# Patient Record
Sex: MALE | Race: WHITE | NOT HISPANIC OR LATINO | Employment: OTHER | URBAN - METROPOLITAN AREA
[De-identification: names, ages, dates, MRNs, and addresses within clinical notes are randomized per-mention and may not be internally consistent; named-entity substitution may affect disease eponyms.]

---

## 2017-04-10 ENCOUNTER — GENERIC CONVERSION - ENCOUNTER (OUTPATIENT)
Dept: OTHER | Facility: OTHER | Age: 77
End: 2017-04-10

## 2017-08-07 ENCOUNTER — ALLSCRIPTS OFFICE VISIT (OUTPATIENT)
Dept: OTHER | Facility: OTHER | Age: 77
End: 2017-08-07

## 2017-08-17 ENCOUNTER — LAB CONVERSION - ENCOUNTER (OUTPATIENT)
Dept: OTHER | Facility: OTHER | Age: 77
End: 2017-08-17

## 2017-08-17 LAB
A/G RATIO (HISTORICAL): 1.4 (CALC) (ref 1–2.5)
ALBUMIN SERPL BCP-MCNC: 3.8 G/DL (ref 3.6–5.1)
ALP SERPL-CCNC: 66 U/L (ref 40–115)
ALT SERPL W P-5'-P-CCNC: 46 U/L (ref 9–46)
AST SERPL W P-5'-P-CCNC: 42 U/L (ref 10–35)
BASOPHILS # BLD AUTO: 0.5 %
BASOPHILS # BLD AUTO: 29 CELLS/UL (ref 0–200)
BILIRUB SERPL-MCNC: 0.6 MG/DL (ref 0.2–1.2)
BUN SERPL-MCNC: 18 MG/DL (ref 7–25)
BUN/CREA RATIO (HISTORICAL): ABNORMAL (CALC) (ref 6–22)
CALCIUM SERPL-MCNC: 9.8 MG/DL (ref 8.6–10.3)
CHLORIDE SERPL-SCNC: 105 MMOL/L (ref 98–110)
CHOLEST SERPL-MCNC: 180 MG/DL (ref 125–200)
CHOLEST/HDLC SERPL: 2.4 (CALC)
CO2 SERPL-SCNC: 30 MMOL/L (ref 20–31)
CREAT SERPL-MCNC: 0.98 MG/DL (ref 0.7–1.18)
DEPRECATED RDW RBC AUTO: 13.5 % (ref 11–15)
EGFR AFRICAN AMERICAN (HISTORICAL): 85 ML/MIN/1.73M2
EGFR-AMERICAN CALC (HISTORICAL): 74 ML/MIN/1.73M2
EOSINOPHIL # BLD AUTO: 194 CELLS/UL (ref 15–500)
EOSINOPHIL # BLD AUTO: 3.4 %
GAMMA GLOBULIN (HISTORICAL): 2.8 G/DL (CALC) (ref 1.9–3.7)
GLUCOSE (HISTORICAL): 93 MG/DL (ref 65–99)
HCT VFR BLD AUTO: 37.7 % (ref 38.5–50)
HDLC SERPL-MCNC: 74 MG/DL
HGB BLD-MCNC: 12.4 G/DL (ref 13.2–17.1)
LDL CHOLESTEROL (HISTORICAL): 94 MG/DL (CALC)
LYMPHOCYTES # BLD AUTO: 2366 CELLS/UL (ref 850–3900)
LYMPHOCYTES # BLD AUTO: 41.5 %
MCH RBC QN AUTO: 30.2 PG (ref 27–33)
MCHC RBC AUTO-ENTMCNC: 32.9 G/DL (ref 32–36)
MCV RBC AUTO: 92 FL (ref 80–100)
MONOCYTES # BLD AUTO: 553 CELLS/UL (ref 200–950)
MONOCYTES (HISTORICAL): 9.7 %
NEUTROPHILS # BLD AUTO: 2559 CELLS/UL (ref 1500–7800)
NEUTROPHILS # BLD AUTO: 44.9 %
NON-HDL-CHOL (CHOL-HDL) (HISTORICAL): 106 MG/DL (CALC)
PLATELET # BLD AUTO: 231 THOUSAND/UL (ref 140–400)
PMV BLD AUTO: 9.6 FL (ref 7.5–12.5)
POTASSIUM SERPL-SCNC: 4.2 MMOL/L (ref 3.5–5.3)
RBC # BLD AUTO: 4.1 MILLION/UL (ref 4.2–5.8)
RBC MORPHOLOGY (HISTORICAL): ABNORMAL
SODIUM SERPL-SCNC: 139 MMOL/L (ref 135–146)
TOTAL PROTEIN (HISTORICAL): 6.6 G/DL (ref 6.1–8.1)
TRIGL SERPL-MCNC: 62 MG/DL
TSH SERPL DL<=0.05 MIU/L-ACNC: 1.49 MIU/L (ref 0.4–4.5)
WBC # BLD AUTO: 5.7 THOUSAND/UL (ref 3.8–10.8)

## 2017-09-06 ENCOUNTER — GENERIC CONVERSION - ENCOUNTER (OUTPATIENT)
Dept: OTHER | Facility: OTHER | Age: 77
End: 2017-09-06

## 2018-01-10 NOTE — RESULT NOTES
Verified Results  (1) LIPID PANEL, FASTING [id number] 07:09AM Clotilde Medina     Test Name Result Flag Reference   CHOLESTEROL, TOTAL 167 mg/dL  125-200   HDL CHOLESTEROL 69 mg/dL  > OR = 40   TRIGLICERIDES 66 mg/dL  <844   LDL-CHOLESTEROL 85 mg/dL (calc)  <130   Desirable range <100 mg/dL for patients with CHD or  diabetes and <70 mg/dL for diabetic patients with  known heart disease  CHOL/HDLC RATIO 2 4 (calc)  < OR = 5 0   NON HDL CHOLESTEROL 98 mg/dL (calc)     Target for non-HDL cholesterol is 30 mg/dL higher than   LDL cholesterol target  (1) BASIC METABOLIC PROFILE [id number] 07:09AM Clotilde Medina   REPORT COMMENT:  FASTING:YES     Test Name Result Flag Reference   GLUCOSE 95 mg/dL  65-99   Fasting reference interval   UREA NITROGEN (BUN) 19 mg/dL  7-25   CREATININE 0 80 mg/dL  0 70-1 18   For patients >52years of age, the reference limit  for Creatinine is approximately 13% higher for people  identified as -American  eGFR NON-AFR  AMERICAN 86 mL/min/1 73m2  > OR = 60   eGFR AFRICAN AMERICAN 100 mL/min/1 73m2  > OR = 60   BUN/CREATININE RATIO   4-98   NOT APPLICABLE (calc)   SODIUM 140 mmol/L  135-146   POTASSIUM 4 2 mmol/L  3 5-5 3   CHLORIDE 105 mmol/L     CARBON DIOXIDE 28 mmol/L  19-30   CALCIUM 9 5 mg/dL  8 6-10 3   We received your handwritten test order for a chemistry  panel containing 8 to 13 analytes  We performed the AMA   defined Basic Metabolic Panel  If this is not what you   intended to order, please contact your local client   immediately so that we can  adjust our billing appropriately  You may also inquire  about alternative or additional testing

## 2018-01-12 NOTE — RESULT NOTES
Verified Results  (1) CBC/PLT/DIFF [id number] 08:29AM [facility]     Test Name Result Flag Reference   WHITE BLOOD CELL COUNT 5 7 Thousand/uL  3 8-10 8   RED BLOOD CELL COUNT 4 10 Million/uL L 4 20-5 80   HEMOGLOBIN 12 4 g/dL L 13 2-17 1   HEMATOCRIT 37 7 % L 38 5-50 0   MCV 92 0 fL  80 0-100 0   MCH 30 2 pg  27 0-33 0   MCHC 32 9 g/dL  32 0-36 0   RDW 13 5 %  11 0-15 0   PLATELET COUNT 818 Thousand/uL  140-400   ABSOLUTE NEUTROPHILS 2559 cells/uL  7084-3344   ABSOLUTE LYMPHOCYTES 2366 cells/uL  850-3900   ABSOLUTE MONOCYTES 553 cells/uL  200-950   ABSOLUTE EOSINOPHILS 194 cells/uL     ABSOLUTE BASOPHILS 29 cells/uL  0-200   NEUTROPHILS 44 9 %     LYMPHOCYTES 41 5 %     MONOCYTES 9 7 %     EOSINOPHILS 3 4 %     BASOPHILS 0 5 %     CBC MORPHOLOGY   NORMAL   Red cell morphology appears unremarkable   MPV 9 6 fL  7 5-12 5     (1) COMPREHENSIVE METABOLIC PANEL [id number] 74:92NF [facility]     Test Name Result Flag Reference   GLUCOSE 93 mg/dL  65-99   Fasting reference interval   UREA NITROGEN (BUN) 18 mg/dL  7-25   CREATININE 0 98 mg/dL  0 70-1 18   For patients >52years of age, the reference limit  for Creatinine is approximately 13% higher for people  identified as -American  eGFR NON-AFR   AMERICAN 74 mL/min/1 73m2  > OR = 60   eGFR AFRICAN AMERICAN 85 mL/min/1 73m2  > OR = 60   BUN/CREATININE RATIO   8-00   NOT APPLICABLE (calc)   SODIUM 139 mmol/L  135-146   POTASSIUM 4 2 mmol/L  3 5-5 3   CHLORIDE 105 mmol/L     CARBON DIOXIDE 30 mmol/L  20-31   CALCIUM 9 8 mg/dL  8 6-10 3   PROTEIN, TOTAL 6 6 g/dL  6 1-8 1   ALBUMIN 3 8 g/dL  3 6-5 1   GLOBULIN 2 8 g/dL (calc)  1 9-3 7   ALBUMIN/GLOBULIN RATIO 1 4 (calc)  1 0-2 5   BILIRUBIN, TOTAL 0 6 mg/dL  0 2-1 2   ALKALINE PHOSPHATASE 66 U/L     AST 42 U/L H 10-35   ALT 46 U/L  9-46     (1) LIPID PANEL, FASTING [id number] 08:29AM [facility]     Test Name Result Flag Reference   CHOLESTEROL, TOTAL 180 mg/dL  125-200   HDL CHOLESTEROL 74 mg/dL > OR = 40   TRIGLICERIDES 62 mg/dL  <418   LDL-CHOLESTEROL 94 mg/dL (calc)  <130   Desirable range <100 mg/dL for patients with CHD or  diabetes and <70 mg/dL for diabetic patients with  known heart disease  CHOL/HDLC RATIO 2 4 (calc)  < OR = 5 0    See Below     We received your handwritten test order and  performed the AMA defined lipid panel  If  this is not what you intended to order, please  contact your local   immediately so that we may adjust our billing  appropriately  You may also inquire about  alternative or additional testing  NON HDL CHOLESTEROL 106 mg/dL (calc)     Target for non-HDL cholesterol is 30 mg/dL higher than   LDL cholesterol target       (Q) TSH, 3RD GENERATION [id number] 08:29AM Kate Kaminski   REPORT COMMENT:  FASTING:YES     Test Name Result Flag Reference   TSH 1 49 mIU/L  0 40-4 50

## 2020-01-22 ENCOUNTER — APPOINTMENT (RX ONLY)
Dept: URBAN - METROPOLITAN AREA CLINIC 151 | Facility: CLINIC | Age: 80
Setting detail: DERMATOLOGY
End: 2020-01-22

## 2020-01-22 DIAGNOSIS — L57.0 ACTINIC KERATOSIS: ICD-10-CM

## 2020-01-22 DIAGNOSIS — Z71.89 OTHER SPECIFIED COUNSELING: ICD-10-CM

## 2020-01-22 DIAGNOSIS — Z85.828 PERSONAL HISTORY OF OTHER MALIGNANT NEOPLASM OF SKIN: ICD-10-CM

## 2020-01-22 DIAGNOSIS — L90.5 SCAR CONDITIONS AND FIBROSIS OF SKIN: ICD-10-CM

## 2020-01-22 DIAGNOSIS — L82.1 OTHER SEBORRHEIC KERATOSIS: ICD-10-CM

## 2020-01-22 DIAGNOSIS — L81.4 OTHER MELANIN HYPERPIGMENTATION: ICD-10-CM

## 2020-01-22 DIAGNOSIS — D18.0 HEMANGIOMA: ICD-10-CM

## 2020-01-22 PROBLEM — D18.01 HEMANGIOMA OF SKIN AND SUBCUTANEOUS TISSUE: Status: ACTIVE | Noted: 2020-01-22

## 2020-01-22 PROCEDURE — 17000 DESTRUCT PREMALG LESION: CPT

## 2020-01-22 PROCEDURE — ? LIQUID NITROGEN

## 2020-01-22 PROCEDURE — ? OTHER

## 2020-01-22 PROCEDURE — 17003 DESTRUCT PREMALG LES 2-14: CPT

## 2020-01-22 PROCEDURE — 99202 OFFICE O/P NEW SF 15 MIN: CPT | Mod: 25

## 2020-01-22 PROCEDURE — ? COUNSELING

## 2020-01-22 PROCEDURE — ? FULL BODY SKIN EXAM

## 2020-01-22 ASSESSMENT — LOCATION DETAILED DESCRIPTION DERM
LOCATION DETAILED: RIGHT NASAL ALA
LOCATION DETAILED: RIGHT RIB CAGE
LOCATION DETAILED: RIGHT SUPERIOR LATERAL NECK
LOCATION DETAILED: RIGHT MID-UPPER BACK
LOCATION DETAILED: LEFT SUPERIOR LATERAL BUCCAL CHEEK
LOCATION DETAILED: LEFT POSTERIOR SHOULDER
LOCATION DETAILED: LEFT MID-UPPER BACK
LOCATION DETAILED: NASAL SUPRATIP
LOCATION DETAILED: LEFT ANTERIOR DISTAL UPPER ARM
LOCATION DETAILED: LEFT RIB CAGE
LOCATION DETAILED: LEFT ANTERIOR SHOULDER
LOCATION DETAILED: RIGHT MEDIAL INFERIOR CHEST
LOCATION DETAILED: NASAL DORSUM
LOCATION DETAILED: RIGHT ANTERIOR DISTAL THIGH
LOCATION DETAILED: LEFT ANTERIOR DISTAL THIGH
LOCATION DETAILED: RIGHT VENTRAL DISTAL FOREARM
LOCATION DETAILED: PERIUMBILICAL SKIN
LOCATION DETAILED: LEFT PROXIMAL CALF
LOCATION DETAILED: RIGHT ANTERIOR DISTAL UPPER ARM
LOCATION DETAILED: RIGHT POSTERIOR SHOULDER
LOCATION DETAILED: LEFT PROXIMAL PRETIBIAL REGION
LOCATION DETAILED: RIGHT PROXIMAL PRETIBIAL REGION
LOCATION DETAILED: RIGHT DISTAL DORSAL FOREARM
LOCATION DETAILED: LEFT DISTAL POSTERIOR THIGH
LOCATION DETAILED: RIGHT DISTAL POSTERIOR THIGH
LOCATION DETAILED: SUPERIOR LUMBAR SPINE
LOCATION DETAILED: LEFT FOREHEAD
LOCATION DETAILED: LEFT MEDIAL INFERIOR CHEST
LOCATION DETAILED: LEFT INFERIOR UPPER BACK

## 2020-01-22 ASSESSMENT — LOCATION ZONE DERM
LOCATION ZONE: FACE
LOCATION ZONE: LEG
LOCATION ZONE: TRUNK
LOCATION ZONE: NOSE
LOCATION ZONE: ARM
LOCATION ZONE: NECK

## 2020-01-22 ASSESSMENT — LOCATION SIMPLE DESCRIPTION DERM
LOCATION SIMPLE: LEFT CHEEK
LOCATION SIMPLE: RIGHT PRETIBIAL REGION
LOCATION SIMPLE: LEFT UPPER BACK
LOCATION SIMPLE: NOSE
LOCATION SIMPLE: RIGHT FOREARM
LOCATION SIMPLE: ABDOMEN
LOCATION SIMPLE: LOWER BACK
LOCATION SIMPLE: LEFT FOREHEAD
LOCATION SIMPLE: LEFT THIGH
LOCATION SIMPLE: LEFT SHOULDER
LOCATION SIMPLE: RIGHT UPPER BACK
LOCATION SIMPLE: CHEST
LOCATION SIMPLE: RIGHT POSTERIOR THIGH
LOCATION SIMPLE: RIGHT NOSE
LOCATION SIMPLE: RIGHT SHOULDER
LOCATION SIMPLE: LEFT CALF
LOCATION SIMPLE: NECK
LOCATION SIMPLE: LEFT PRETIBIAL REGION
LOCATION SIMPLE: RIGHT UPPER ARM
LOCATION SIMPLE: LEFT UPPER ARM
LOCATION SIMPLE: RIGHT THIGH
LOCATION SIMPLE: LEFT POSTERIOR THIGH

## 2020-01-22 NOTE — PROCEDURE: OTHER
Note Text (......Xxx Chief Complaint.): This diagnosis correlates with the
Detail Level: Zone
Other (Free Text): Per patient surgically removed 2006

## 2020-01-22 NOTE — HPI: NON-MELANOMA SKIN CANCER F/U (HISTORY OF NMSC)
How Many Skin Cancers Have You Had?: one
What Is The Reason For Today's Visit?: History of Non-Melanoma Skin Cancer
When Was Your Last Cancer Diagnosed?: 2006 per patient surgically removed

## 2020-09-14 ENCOUNTER — CONSULT (OUTPATIENT)
Dept: DERMATOLOGY | Facility: CLINIC | Age: 80
End: 2020-09-14
Payer: COMMERCIAL

## 2020-09-14 ENCOUNTER — TELEPHONE (OUTPATIENT)
Dept: DERMATOLOGY | Facility: CLINIC | Age: 80
End: 2020-09-14

## 2020-09-14 VITALS — WEIGHT: 145 LBS | TEMPERATURE: 97.3 F | HEIGHT: 65 IN | BODY MASS INDEX: 24.16 KG/M2

## 2020-09-14 DIAGNOSIS — D48.9 NEOPLASM OF UNCERTAIN BEHAVIOR: Primary | ICD-10-CM

## 2020-09-14 PROCEDURE — 11102 TANGNTL BX SKIN SINGLE LES: CPT | Performed by: DERMATOLOGY

## 2020-09-14 PROCEDURE — 88305 TISSUE EXAM BY PATHOLOGIST: CPT | Performed by: STUDENT IN AN ORGANIZED HEALTH CARE EDUCATION/TRAINING PROGRAM

## 2020-09-14 PROCEDURE — 99203 OFFICE O/P NEW LOW 30 MIN: CPT | Performed by: DERMATOLOGY

## 2020-09-14 PROCEDURE — 11103 TANGNTL BX SKIN EA SEP/ADDL: CPT | Performed by: DERMATOLOGY

## 2020-09-14 RX ORDER — MELATONIN
1000 DAILY
COMMUNITY

## 2020-09-14 RX ORDER — ASPIRIN 81 MG/1
81 TABLET ORAL DAILY
COMMUNITY

## 2020-09-14 RX ORDER — AMLODIPINE BESYLATE AND ATORVASTATIN CALCIUM 5; 10 MG/1; MG/1
1 TABLET, FILM COATED ORAL DAILY
COMMUNITY

## 2020-09-14 NOTE — PROGRESS NOTES
[facility] Note     Patient Name: Janet Contreras  Encounter Date: [id number]     Have you been cared for by a [facility] in the last 3 years and, if so, which one? No    · Have you traveled outside of the [address] [place] [address] in the past 3 months or outside of the [facility] area in the last 2 weeks? No     May we call your Preferred Phone number to discuss your specific medical information? Yes     May we leave a detailed message that includes your specific medical information? Yes      Today's Chief Concerns:   Concern #1:  Bump on nose   Concern #2:      Past Medical History:  Have you personally ever had or currently have any of the following? · Skin cancer (such as Melanoma, Basal Cell Carcinoma, Squamous Cell Carcinoma? (If Yes, please provide more detail)- YES, BCC in 2006  · Eczema: No  · Psoriasis: No  · HIV/AIDS: No  · Hepatitis B or C: No  · Tuberculosis: No  · Systemic Immunosuppression such as Diabetes, Biologic or Immunotherapy, Chemotherapy, Organ Transplantation, Bone Marrow Transplantation (If YES, please provide more detail): No  · Radiation Treatment (If YES, please provide more detail): No  · Any other major medical conditions/concerns? (If Yes, which types)- YES, Prostate cancer 9/2006 radiation treatment    Social History:     What is/was your primary occupation? retired     What are your hobbies/past-times? Home making    Family History:  Have any of your "first degree relatives" (parent, brother, sister, or child) had any of the following       · Skin cancer such as Melanoma or Merkel Cell Carcinoma or Pancreatic Cancer? No  · Eczema, Asthma, Hay Fever or Seasonal Allergies: No  · Psoriasis or Psoriatic Arthritis: No  · Do any other medical conditions seem to run in your family? If Yes, what condition and which relatives?   YES, Father prostate cancer    Current Medications:   (please update all dermatological medications before printing patient's AVS!)      Current Outpatient Medications:     amLODIPine-atorvastatin (CADUET) 5-10 MG per tablet, Take 1 tablet by mouth daily, Disp: , Rfl:     aspirin (ECOTRIN LOW STRENGTH) 81 mg EC tablet, Take 81 mg by mouth daily, Disp: , Rfl:     bimatoprost (LUMIGAN) 0 01 % ophthalmic drops, 1 drop daily at bedtime, Disp: , Rfl:     cholecalciferol (VITAMIN D3) 1,000 units tablet, Take 1,000 Units by mouth daily, Disp: , Rfl:     timolol (BETIMOL) 0 5 % ophthalmic solution, 1 drop 2 (two) times a day, Disp: , Rfl:       Review of Systems:  Have you recently had or currently have any of the following? If YES, what are you doing for the problem? · Fever, chills or unintended weight loss: No  · Sudden loss or change in your vision: No  · Nausea, vomiting or blood in your stool: No  · Painful or swollen joints: No  · Wheezing or cough: No  · Changing mole or non-healing wound: YES, mole on nose  · Nosebleeds: No  · Excessive sweating: No  · Easy or prolonged bleeding? YES, Aspirin 81 mg  · Over the last 2 weeks, how often have you been bothered by the following problems? · Taking little interest or pleasure in doing things: 1 - Not at All  · Feeling down, depressed, or hopeless: 1 - Not at All  · Rapid heartbeat with epinephrine:  No    · FEMALES ONLY:    · Are you pregnant or planning to become pregnant? N/A  · Are you currently or planning to be nursing or breast feeding? N/A    · Any known allergies? No Known Allergies      Physical Exam:     Was a chaperone (Derm Clinical Assistant) present throughout the entire Physical Exam? Yes     Did the Dermatology Team specifically  the patient on the importance of a Full Skin Exam to be sure that nothing is missed clinically?  Yes}  o Did the patient ultimately request or accept a Full Skin Exam?  Yes  o Did the patient specifically refuse to have the areas "under-the-bra" examined by the Dermatologist? Rosa Sifuentes  o Did the patient specifically refuse to have the areas "under-the-underwear" examined by the Dermatologist? YES    CONSTITUTIONAL:   Vitals:    20 0934   Temp: (!) 97 3 °F (36 3 °C)   TempSrc: Temporal   Weight: 65 8 kg (145 lb)   Height: 5' 5" (1 651 m)       PSYCH: Normal mood and affect  EYES: Normal conjunctiva  ENT: Normal lips and oral mucosa  CARDIOVASCULAR: No edema  RESPIRATORY: Normal respirations  HEME/LYMPH/IMMUNO:  No regional lymphadenopathy except as noted below in "ASSESSMENT AND PLAN BY DIAGNOSIS"    SKIN:  FULL ORGAN SYSTEM EXAM  DELETE AFTER COMPLETION  Hair, Scalp, Ears, Face Normal except as noted below in Assessment     Assessment and Plan by Diagnosis:    History of Present Condition:     Duration:  How long has this been an issue for you?    o  2 months   Location Affected:  Where on the body is this affecting you?    o  nose   Quality:  Is there any bleeding, pain, itch, burning/irritation, or redness associated with the skin lesion? o  denies   Severity:  Describe any bleeding, pain, itch, burning/irritation, or redness on a scale of 1 to 10 (with 10 being the worst)  o  0   Timing:  Does this condition seem to be there pretty constantly or do you notice it more at specific times throughout the day?     o  constatnt   Context:  Have you ever noticed that this condition seems to be associated with specific activities you do?    o  denies   Modifying Factors:    o Anything that seems to make the condition worse?    -  denies  o What have you tried to do to make the condition better?    -  alcohol,vinegar, over the counter antibiotic cream   Associated Signs and Symptoms:  Does this skin lesion seem to be associated with any of the followin  NEOPLASM OF UNCERTAIN BEHAVIOR OF SKIN - SPECIMEN A    Physical Exam:   (Anatomic Location); (Size and Morphological Description); (Differential Diagnosis):    Right Supra-nasal tip; round firm somewhat discrete papule with 0 6 cm; rule out BCC vs SCC  o    Pertinent Positives:   Pertinent Negatives: No regional lymphadenopathy        Assessment and Plan:   I have discussed with the patient that a sample of skin via a "skin biopsy would be potentially helpful to further make a specific diagnosis under the microscope   Based on a thorough discussion of this condition and the management approach to it (including a comprehensive discussion of the known risks, side effects and potential benefits of treatment), the patient (family) agrees to implement the following specific plan:    o Procedure:  Skin Biopsy  After a thorough discussion of treatment options and risk/benefits/alternatives (including but not limited to local pain, scarring, dyspigmentation, blistering, possible superinfection, and inability to confirm a diagnosis via histopathology), verbal and written consent were obtained and portion of the rash was biopsied for tissue sample  See below for consent that was obtained from patient and subsequent Procedure Note  PROCEDURE SHAVE BIOPSY NOTE:  SPECIMEN A     Performing Physician: Dorian Alarcon   Anatomic Location; Clinical Description with size (cm); Pre-Op Diagnosis:   o A:Right Supra-nasal tip; round firm somewhat discrete papule with 0 6 cm; rule out BCC vs SCC     Post-op diagnosis: Same      Local anesthesia: 1% xylocaine with epi       Topical anesthesia: None     Hemostasis: Aluminum chloride       After obtaining informed consent  at which time there was a discussion about the purpose of biopsy  and low risks of infection and bleeding  The area was prepped and draped in the usual fashion  Anesthesia was obtained with 1% lidocaine with epinephrine  A shave biopsy to an appropriate sampling depth was obtained with a sterile blade (such as a 15-blade or DermaBlade)  The resulting wound was covered with surgical ointment and bandaged appropriately       The patient tolerated the procedure well without complications and was without signs of functional compromise  Specimen has been sent for review by Dermatopathology  Standard post-procedure care has been explained and has been included in written form within the patient's copy of Informed Consent  INFORMED CONSENT DISCUSSION AND POST-OPERATIVE INSTRUCTIONS FOR PATIENT    I   RATIONALE FOR PROCEDURE  I understand that a skin biopsy allows the Dermatologist to test a lesion or rash under the microscope to obtain a diagnosis  It usually involves numbing the area with numbing medication and removing a small piece of skin; sometimes the area will be closed with sutures  In this specific procedure, sutures are not usually needed  If any sutures are placed, then they are usually need to be removed in 2 weeks or less  I understand that my Dermatologist recommends that a skin "shave" biopsy be performed today  A local anesthetic, similar to the kind that a dentist uses when filling a cavity, will be injected with a very small needle into the skin area to be sampled  The injected skin and tissue underneath "will go to sleep and become numb so no pain should be felt afterwards  An instrument shaped like a tiny "razor blade" (shave biopsy instrument) will be used to cut a small piece of tissue and skin from the area so that a sample of tissue can be taken and examined more closely under the microscope  A slight amount of bleeding will occur, but it will be stopped with direct pressure and a pressure bandage and any other appropriate methods  I understands that a scar will form where the wound was created  Surgical ointment will be applied to help protect the wound  Sutures are not usually needed      II   RISKS AND POTENTIAL COMPLICATIONS   I understand the risks and potential complications of a skin biopsy include but are not limited to the following:   Bleeding   Infection   Pain   Scar/keloid   Skin discoloration   Incomplete Removal   Recurrence   Nerve Damage/Numbness/Loss of Function   Allergic Reaction to Anesthesia   Biopsies are diagnostic procedures and based on findings additional treatment or evaluation may be required   Loss or destruction of specimen resulting in no additional findings    My Dermatologist has explained to me the nature of the condition, the nature of the procedure, and the benefits to be reasonably expected compared with alternative approaches  My Dermatologist has discussed the likelihood of major risks or complications of this procedure including the specific risks listed above, such as bleeding, infection, and scarring/keloid  I understand that a scar is expected after this procedure  I understand that my physician cannot predict if the scar will form a "keloid," which extends beyond the borders of the wound that is created  A keloid is a thick, painful, and bumpy scar  A keloid can be difficult to treat, as it does not always respond well to therapy, which includes injecting cortisone directly into the keloid every few weeks  While this usually reduces the pain and size of the scar, it does not eliminate it  I understand that photographs may be taken before and after the procedure  These will be maintained as part of the medical providers confidential records and may not be made available to me  I further authorize the medical provider to use the photographs for teaching purposes or to illustrate scientific papers, books, or lectures if in his/her judgment, medical research, education, or science may benefit from its use  I have had an opportunity to fully inquire about the risks and benefits of this procedure and its alternatives  I have been given ample time and opportunity to ask questions and to seek a second opinion if I wished to do so  I acknowledge that there have specifically been no guarantees as to the cosmetic results from the procedure    I am aware that with any procedure there is always the possibility of an unexpected complication  III  POST-PROCEDURAL CARE (WHAT YOU WILL NEED TO DO "AFTER THE BIOPSY" TO OPTIMIZE HEALING)     Keep the area clean and dry  Try NOT to remove the bandage or get it wet for the first 24 hours   Gently clean the area and apply surgical ointment (such as Vaseline petrolatum ointment, which is available "over the counter" and not a prescription) to the biopsy site for up to 2 weeks straight  This acts to protect the wound from the outside world   Sutures are not usually placed in this procedure  If any sutures were placed, return for suture removal as instructed (generally 1 week for the face, 2 weeks for the body)   Take Acetaminophen (Tylenol) for discomfort, if no contraindications  Ibuprofen or aspirin could make bleeding worse   Call our office immediately for signs of infection: fever, chills, increased redness, warmth, tenderness, discomfort/pain, or pus or foul smell coming from the wound  WHAT TO DO IF THERE IS ANY BLEEDING? If a small amount of bleeding is noticed, place a clean cloth over the area and apply firm pressure for ten minutes  Check the wound after 10 minutes of direct pressure  If bleeding persists, try one more time for an additional 10 minutes of direct pressure on the area  If the bleeding becomes heavier or does not stop after the second attempt, or if you have any other questions about this procedure, then please call your [facility] Dermatologist by calling [phone] (SKIN)  I hereby acknowledge that I have reviewed and verified the site with my Dermatologist and have requested and authorized my Dermatologist to proceed with the procedure        2  NEOPLASM OF UNCERTAIN BEHAVIOR OF SKIN:  SPECIMEN B    Physical Exam:   (Anatomic Location); (Size and Morphological Description); (Differential Diagnosis):     - B: Right Auricle; pink rectangular papule with 0 6 cm; rule out SCC   Pertinent Positives:   Pertinent Negatives: No regional lymphadenopathy    Additional History of Present Condition:  Denies pain with sleeping  No bleeding  Unsure how long this has been here  Assessment and Plan:   I have discussed with the patient that a sample of skin via a "skin biopsy would be potentially helpful to further make a specific diagnosis under the microscope   Based on a thorough discussion of this condition and the management approach to it (including a comprehensive discussion of the known risks, side effects and potential benefits of treatment), the patient (family) agrees to implement the following specific plan:    o Recommend yearly skin exams from an adult dermatologist  o Procedure:  Skin Biopsy  After a thorough discussion of treatment options and risk/benefits/alternatives (including but not limited to local pain, scarring, dyspigmentation, blistering, possible superinfection, and inability to confirm a diagnosis via histopathology), verbal and written consent were obtained and portion of the rash was biopsied for tissue sample  See below for consent that was obtained from patient and subsequent Procedure Note  PROCEDURE SHAVE BIOPSY NOTE:  Aime Bahena B     Performing Physician: Lakisha Tejeda   Anatomic Location; Clinical Description with size (cm); Pre-Op Diagnosis:       B: Right Auricle; pink rectangular papule with 0 6 cm; rule out SCC   Post-op diagnosis: Same      Local anesthesia: 1% xylocaine with epi       Topical anesthesia: None     Hemostasis: Aluminum chloride       After obtaining informed consent  at which time there was a discussion about the purpose of biopsy  and low risks of infection and bleeding  The area was prepped and draped in the usual fashion  Anesthesia was obtained with 1% lidocaine with epinephrine  A shave biopsy to an appropriate sampling depth was obtained with a sterile blade (such as a 15-blade or DermaBlade)   The resulting wound was covered with surgical ointment and bandaged appropriately  The patient tolerated the procedure well without complications and was without signs of functional compromise  Specimen has been sent for review by Dermatopathology  Standard post-procedure care has been explained and has been included in written form within the patient's copy of Informed Consent  INFORMED CONSENT DISCUSSION AND POST-OPERATIVE INSTRUCTIONS FOR PATIENT    I   RATIONALE FOR PROCEDURE  I understand that a skin biopsy allows the Dermatologist to test a lesion or rash under the microscope to obtain a diagnosis  It usually involves numbing the area with numbing medication and removing a small piece of skin; sometimes the area will be closed with sutures  In this specific procedure, sutures are not usually needed  If any sutures are placed, then they are usually need to be removed in 2 weeks or less  I understand that my Dermatologist recommends that a skin "shave" biopsy be performed today  A local anesthetic, similar to the kind that a dentist uses when filling a cavity, will be injected with a very small needle into the skin area to be sampled  The injected skin and tissue underneath "will go to sleep and become numb so no pain should be felt afterwards  An instrument shaped like a tiny "razor blade" (shave biopsy instrument) will be used to cut a small piece of tissue and skin from the area so that a sample of tissue can be taken and examined more closely under the microscope  A slight amount of bleeding will occur, but it will be stopped with direct pressure and a pressure bandage and any other appropriate methods  I understands that a scar will form where the wound was created  Surgical ointment will be applied to help protect the wound  Sutures are not usually needed      II   RISKS AND POTENTIAL COMPLICATIONS   I understand the risks and potential complications of a skin biopsy include but are not limited to the following:   Bleeding   Infection   Pain   Scar/keloid   Skin discoloration   Incomplete Removal   Recurrence   Nerve Damage/Numbness/Loss of Function   Allergic Reaction to Anesthesia   Biopsies are diagnostic procedures and based on findings additional treatment or evaluation may be required   Loss or destruction of specimen resulting in no additional findings    My Dermatologist has explained to me the nature of the condition, the nature of the procedure, and the benefits to be reasonably expected compared with alternative approaches  My Dermatologist has discussed the likelihood of major risks or complications of this procedure including the specific risks listed above, such as bleeding, infection, and scarring/keloid  I understand that a scar is expected after this procedure  I understand that my physician cannot predict if the scar will form a "keloid," which extends beyond the borders of the wound that is created  A keloid is a thick, painful, and bumpy scar  A keloid can be difficult to treat, as it does not always respond well to therapy, which includes injecting cortisone directly into the keloid every few weeks  While this usually reduces the pain and size of the scar, it does not eliminate it  I understand that photographs may be taken before and after the procedure  These will be maintained as part of the medical providers confidential records and may not be made available to me  I further authorize the medical provider to use the photographs for teaching purposes or to illustrate scientific papers, books, or lectures if in his/her judgment, medical research, education, or science may benefit from its use  I have had an opportunity to fully inquire about the risks and benefits of this procedure and its alternatives  I have been given ample time and opportunity to ask questions and to seek a second opinion if I wished to do so    I acknowledge that there have specifically been no guarantees as to the cosmetic results from the procedure  I am aware that with any procedure there is always the possibility of an unexpected complication  III  POST-PROCEDURAL CARE (WHAT YOU WILL NEED TO DO "AFTER THE BIOPSY" TO OPTIMIZE HEALING)     Keep the area clean and dry  Try NOT to remove the bandage or get it wet for the first 24 hours   Gently clean the area and apply surgical ointment (such as Vaseline petrolatum ointment, which is available "over the counter" and not a prescription) to the biopsy site for up to 2 weeks straight  This acts to protect the wound from the outside world   Sutures are not usually placed in this procedure  If any sutures were placed, return for suture removal as instructed (generally 1 week for the face, 2 weeks for the body)   Take Acetaminophen (Tylenol) for discomfort, if no contraindications  Ibuprofen or aspirin could make bleeding worse   Call our office immediately for signs of infection: fever, chills, increased redness, warmth, tenderness, discomfort/pain, or pus or foul smell coming from the wound  WHAT TO DO IF THERE IS ANY BLEEDING? If a small amount of bleeding is noticed, place a clean cloth over the area and apply firm pressure for ten minutes  Check the wound after 10 minutes of direct pressure  If bleeding persists, try one more time for an additional 10 minutes of direct pressure on the area  If the bleeding becomes heavier or does not stop after the second attempt, or if you have any other questions about this procedure, then please call your [facility] Dermatologist by calling [phone] (SKIN)  I hereby acknowledge that I have reviewed and verified the site with my Dermatologist and have requested and authorized my Dermatologist to proceed with the procedure                   Scribe Attestation    I,:   Sajan Riddle am acting as a scribe while in the presence of the attending physician :        I,:   Brittany Morgan Viv Pegueror, MD personally performed the services described in this documentation    as scribed in my presence :

## 2020-09-14 NOTE — TELEPHONE ENCOUNTER
Patients wife called in stating that they have HMO and they need a referral if her  should need to have surgery  Who does she need to get this referral from? Do we need to contact his PCP or does she have to? Please advice!

## 2020-09-14 NOTE — PATIENT INSTRUCTIONS
NEOPLASMS BOTH OF UNCERTAIN BEHAVIOR OF SKIN    Assessment and Plan:   I have discussed with the patient that a sample of skin via a "skin biopsy would be potentially helpful to further make a specific diagnosis under the microscope   Based on a thorough discussion of this condition and the management approach to it (including a comprehensive discussion of the known risks, side effects and potential benefits of treatment), the patient (family) agrees to implement the following specific plan:    o Procedure:  Skin Biopsy  After a thorough discussion of treatment options and risk/benefits/alternatives (including but not limited to local pain, scarring, dyspigmentation, blistering, possible superinfection, and inability to confirm a diagnosis via histopathology), verbal and written consent were obtained and portion of the rash was biopsied for tissue sample  See below for consent that was obtained from patient and subsequent Procedure Note  PROCEDURE SHAVE BIOPSY NOTE:        INFORMED CONSENT DISCUSSION AND POST-OPERATIVE INSTRUCTIONS FOR PATIENT    I   RATIONALE FOR PROCEDURE  I understand that a skin biopsy allows the Dermatologist to test a lesion or rash under the microscope to obtain a diagnosis  It usually involves numbing the area with numbing medication and removing a small piece of skin; sometimes the area will be closed with sutures  In this specific procedure, sutures are not usually needed  If any sutures are placed, then they are usually need to be removed in 2 weeks or less  I understand that my Dermatologist recommends that a skin "shave" biopsy be performed today  A local anesthetic, similar to the kind that a dentist uses when filling a cavity, will be injected with a very small needle into the skin area to be sampled  The injected skin and tissue underneath "will go to sleep and become numb so no pain should be felt afterwards    An instrument shaped like a tiny "razor blade" (shave biopsy instrument) will be used to cut a small piece of tissue and skin from the area so that a sample of tissue can be taken and examined more closely under the microscope  A slight amount of bleeding will occur, but it will be stopped with direct pressure and a pressure bandage and any other appropriate methods  I understands that a scar will form where the wound was created  Surgical ointment will be applied to help protect the wound  Sutures are not usually needed  II   RISKS AND POTENTIAL COMPLICATIONS   I understand the risks and potential complications of a skin biopsy include but are not limited to the following:   Bleeding   Infection   Pain   Scar/keloid   Skin discoloration   Incomplete Removal   Recurrence   Nerve Damage/Numbness/Loss of Function   Allergic Reaction to Anesthesia   Biopsies are diagnostic procedures and based on findings additional treatment or evaluation may be required   Loss or destruction of specimen resulting in no additional findings    My Dermatologist has explained to me the nature of the condition, the nature of the procedure, and the benefits to be reasonably expected compared with alternative approaches  My Dermatologist has discussed the likelihood of major risks or complications of this procedure including the specific risks listed above, such as bleeding, infection, and scarring/keloid  I understand that a scar is expected after this procedure  I understand that my physician cannot predict if the scar will form a "keloid," which extends beyond the borders of the wound that is created  A keloid is a thick, painful, and bumpy scar  A keloid can be difficult to treat, as it does not always respond well to therapy, which includes injecting cortisone directly into the keloid every few weeks  While this usually reduces the pain and size of the scar, it does not eliminate it        I understand that photographs may be taken before and after the procedure  These will be maintained as part of the medical providers confidential records and may not be made available to me  I further authorize the medical provider to use the photographs for teaching purposes or to illustrate scientific papers, books, or lectures if in his/her judgment, medical research, education, or science may benefit from its use  I have had an opportunity to fully inquire about the risks and benefits of this procedure and its alternatives  I have been given ample time and opportunity to ask questions and to seek a second opinion if I wished to do so  I acknowledge that there have specifically been no guarantees as to the cosmetic results from the procedure  I am aware that with any procedure there is always the possibility of an unexpected complication  III  POST-PROCEDURAL CARE (WHAT YOU WILL NEED TO DO "AFTER THE BIOPSY" TO OPTIMIZE HEALING)     Keep the area clean and dry  Try NOT to remove the bandage or get it wet for the first 24 hours   Gently clean the area and apply surgical ointment (such as Vaseline petrolatum ointment, which is available "over the counter" and not a prescription) to the biopsy site for up to 2 weeks straight  This acts to protect the wound from the outside world   Sutures are not usually placed in this procedure  If any sutures were placed, return for suture removal as instructed (generally 1 week for the face, 2 weeks for the body)   Take Acetaminophen (Tylenol) for discomfort, if no contraindications  Ibuprofen or aspirin could make bleeding worse   Call our office immediately for signs of infection: fever, chills, increased redness, warmth, tenderness, discomfort/pain, or pus or foul smell coming from the wound  WHAT TO DO IF THERE IS ANY BLEEDING? If a small amount of bleeding is noticed, place a clean cloth over the area and apply firm pressure for ten minutes  Check the wound after 10 minutes of direct pressure  If bleeding persists, try one more time for an additional 10 minutes of direct pressure on the area  If the bleeding becomes heavier or does not stop after the second attempt, or if you have any other questions about this procedure, then please call your [facility] Dermatologist by calling [phone] (SKIN)  I hereby acknowledge that I have reviewed and verified the site with my Dermatologist and have requested and authorized my Dermatologist to proceed with the procedure

## 2020-09-21 ENCOUNTER — TELEPHONE (OUTPATIENT)
Dept: DERMATOLOGY | Facility: CLINIC | Age: 80
End: 2020-09-21

## 2020-09-21 NOTE — RESULT ENCOUNTER NOTE
Telephone call to patient in regards to scheduling MOHS for [facility] right supra-nasal tip  Voice message left advising patient to return call at earliest convenience

## 2020-09-21 NOTE — TELEPHONE ENCOUNTER
Telephone call to patient in regards to scheduling MOHS for [facility] right supra-nasal tip  Advised patient to return call at earliest convenience

## 2020-09-22 NOTE — TELEPHONE ENCOUNTER
Telephone call to patient  Left voice message in regards scheduling MOHS   Pt is to return my call as earliest of he's convenience

## 2020-09-30 NOTE — TELEPHONE ENCOUNTER
Patients wife is asking us to call her  Dr Ngozi Vuong in [place] to request a prior authorization from them  She left the phone number for Caro Abdalla [phone]  She is asking for someone to call her back please  Thanks

## 2020-10-02 ENCOUNTER — TELEPHONE (OUTPATIENT)
Dept: DERMATOLOGY | Facility: CLINIC | Age: 80
End: 2020-10-02

## 2020-10-05 NOTE — TELEPHONE ENCOUNTER
Patients wife called in stating she has been calling for months and has not been able to schedule an appointment for her  for Mohs surgery  I told her she was contacted twice from someone in that department  She added they had the wrong number  The number that is now in the patient chart is correct after confirmation  She was told the only thing we can do is send a message to that department since we do not schedule for Mohs  She wanted to know if she would get a call t today  She was told I will send a message and they will get back to her as soon as they can

## 2020-10-07 NOTE — RESULT ENCOUNTER NOTE
Telephone call from patient wife  Spoke to wife in regards scheduling MOHS   Pt scheduled 10/27/2020 @ 9:00am Dr Willie Carrillo

## 2020-10-26 ENCOUNTER — NURSE TRIAGE (OUTPATIENT)
Dept: OTHER | Facility: OTHER | Age: 80
End: 2020-10-26

## 2020-10-27 ENCOUNTER — PROCEDURE VISIT (OUTPATIENT)
Dept: DERMATOLOGY | Facility: CLINIC | Age: 80
End: 2020-10-27
Payer: COMMERCIAL

## 2020-10-27 VITALS
DIASTOLIC BLOOD PRESSURE: 60 MMHG | WEIGHT: 146 LBS | SYSTOLIC BLOOD PRESSURE: 130 MMHG | TEMPERATURE: 98 F | RESPIRATION RATE: 14 BRPM | HEIGHT: 67 IN | BODY MASS INDEX: 22.91 KG/M2 | HEART RATE: 61 BPM

## 2020-10-27 DIAGNOSIS — C44.311 BASAL CELL CARCINOMA (BCC) OF NASAL TIP: Primary | ICD-10-CM

## 2020-10-27 PROCEDURE — 12051 INTMD RPR FACE/MM 2.5 CM/<: CPT | Performed by: DERMATOLOGY

## 2020-10-27 PROCEDURE — 17311 MOHS 1 STAGE H/N/HF/G: CPT | Performed by: DERMATOLOGY

## 2020-10-30 ENCOUNTER — TELEPHONE (OUTPATIENT)
Dept: DERMATOLOGY | Facility: CLINIC | Age: 80
End: 2020-10-30

## 2020-11-03 ENCOUNTER — OFFICE VISIT (OUTPATIENT)
Dept: DERMATOLOGY | Facility: CLINIC | Age: 80
End: 2020-11-03

## 2020-11-03 DIAGNOSIS — Z48.02 ENCOUNTER FOR REMOVAL OF SUTURES: Primary | ICD-10-CM

## 2020-11-03 PROCEDURE — 99024 POSTOP FOLLOW-UP VISIT: CPT | Performed by: DERMATOLOGY

## 2021-01-20 DIAGNOSIS — Z23 ENCOUNTER FOR IMMUNIZATION: ICD-10-CM

## 2021-05-03 ENCOUNTER — APPOINTMENT (RX ONLY)
Dept: URBAN - METROPOLITAN AREA CLINIC 151 | Facility: CLINIC | Age: 81
Setting detail: DERMATOLOGY
End: 2021-05-03

## 2021-05-03 DIAGNOSIS — L81.4 OTHER MELANIN HYPERPIGMENTATION: ICD-10-CM

## 2021-05-03 DIAGNOSIS — D18.0 HEMANGIOMA: ICD-10-CM

## 2021-05-03 DIAGNOSIS — Z85.828 PERSONAL HISTORY OF OTHER MALIGNANT NEOPLASM OF SKIN: ICD-10-CM

## 2021-05-03 DIAGNOSIS — Z71.89 OTHER SPECIFIED COUNSELING: ICD-10-CM

## 2021-05-03 DIAGNOSIS — L90.5 SCAR CONDITIONS AND FIBROSIS OF SKIN: ICD-10-CM

## 2021-05-03 DIAGNOSIS — L82.1 OTHER SEBORRHEIC KERATOSIS: ICD-10-CM

## 2021-05-03 PROBLEM — D18.01 HEMANGIOMA OF SKIN AND SUBCUTANEOUS TISSUE: Status: ACTIVE | Noted: 2021-05-03

## 2021-05-03 PROCEDURE — 99213 OFFICE O/P EST LOW 20 MIN: CPT

## 2021-05-03 PROCEDURE — ? COUNSELING

## 2021-05-03 PROCEDURE — ? OTHER

## 2021-05-03 PROCEDURE — ? ADDITIONAL NOTES

## 2021-05-03 PROCEDURE — ? FULL BODY SKIN EXAM

## 2021-05-03 ASSESSMENT — LOCATION DETAILED DESCRIPTION DERM
LOCATION DETAILED: RIGHT ANTERIOR DISTAL THIGH
LOCATION DETAILED: LEFT PROXIMAL PRETIBIAL REGION
LOCATION DETAILED: LEFT POSTERIOR SHOULDER
LOCATION DETAILED: LEFT INFERIOR UPPER BACK
LOCATION DETAILED: LEFT MID-UPPER BACK
LOCATION DETAILED: LEFT ANTERIOR DISTAL UPPER ARM
LOCATION DETAILED: LEFT MEDIAL INFERIOR CHEST
LOCATION DETAILED: SUPERIOR LUMBAR SPINE
LOCATION DETAILED: LEFT SUPERIOR LATERAL BUCCAL CHEEK
LOCATION DETAILED: LEFT FOREHEAD
LOCATION DETAILED: PERIUMBILICAL SKIN
LOCATION DETAILED: NASAL DORSUM
LOCATION DETAILED: RIGHT POSTERIOR SHOULDER
LOCATION DETAILED: LEFT DISTAL POSTERIOR THIGH
LOCATION DETAILED: RIGHT MEDIAL INFERIOR CHEST
LOCATION DETAILED: NASAL TIP
LOCATION DETAILED: LEFT RIB CAGE
LOCATION DETAILED: RIGHT DISTAL POSTERIOR THIGH
LOCATION DETAILED: RIGHT VENTRAL DISTAL FOREARM
LOCATION DETAILED: RIGHT ANTERIOR DISTAL UPPER ARM
LOCATION DETAILED: LEFT ANTERIOR SHOULDER
LOCATION DETAILED: LEFT PROXIMAL CALF
LOCATION DETAILED: RIGHT RIB CAGE
LOCATION DETAILED: LEFT ANTERIOR DISTAL THIGH
LOCATION DETAILED: RIGHT MID-UPPER BACK
LOCATION DETAILED: RIGHT PROXIMAL PRETIBIAL REGION

## 2021-05-03 ASSESSMENT — LOCATION SIMPLE DESCRIPTION DERM
LOCATION SIMPLE: LEFT CALF
LOCATION SIMPLE: NOSE
LOCATION SIMPLE: LEFT SHOULDER
LOCATION SIMPLE: RIGHT PRETIBIAL REGION
LOCATION SIMPLE: ABDOMEN
LOCATION SIMPLE: LEFT FOREHEAD
LOCATION SIMPLE: LEFT CHEEK
LOCATION SIMPLE: RIGHT SHOULDER
LOCATION SIMPLE: LOWER BACK
LOCATION SIMPLE: RIGHT POSTERIOR THIGH
LOCATION SIMPLE: RIGHT UPPER ARM
LOCATION SIMPLE: LEFT UPPER BACK
LOCATION SIMPLE: LEFT UPPER ARM
LOCATION SIMPLE: LEFT POSTERIOR THIGH
LOCATION SIMPLE: CHEST
LOCATION SIMPLE: RIGHT THIGH
LOCATION SIMPLE: LEFT PRETIBIAL REGION
LOCATION SIMPLE: RIGHT UPPER BACK
LOCATION SIMPLE: LEFT THIGH
LOCATION SIMPLE: RIGHT FOREARM

## 2021-05-03 ASSESSMENT — LOCATION ZONE DERM
LOCATION ZONE: ARM
LOCATION ZONE: TRUNK
LOCATION ZONE: FACE
LOCATION ZONE: NOSE
LOCATION ZONE: LEG

## 2021-05-03 NOTE — PROCEDURE: OTHER
Note Text (......Xxx Chief Complaint.): This diagnosis correlates with the
Detail Level: Zone
Other (Free Text): Per patient surgically removed 2006
Other (Free Text): Patients [place]. Site checked today, appears healed as expected.
Render Risk Assessment In Note?: no

## 2021-12-06 ENCOUNTER — APPOINTMENT (RX ONLY)
Dept: URBAN - METROPOLITAN AREA CLINIC 151 | Facility: CLINIC | Age: 81
Setting detail: DERMATOLOGY
End: 2021-12-06

## 2021-12-06 DIAGNOSIS — L82.1 OTHER SEBORRHEIC KERATOSIS: ICD-10-CM

## 2021-12-06 DIAGNOSIS — I83.9 ASYMPTOMATIC VARICOSE VEINS OF LOWER EXTREMITIES: ICD-10-CM

## 2021-12-06 DIAGNOSIS — Z71.89 OTHER SPECIFIED COUNSELING: ICD-10-CM

## 2021-12-06 DIAGNOSIS — D18.0 HEMANGIOMA: ICD-10-CM

## 2021-12-06 DIAGNOSIS — L57.0 ACTINIC KERATOSIS: ICD-10-CM

## 2021-12-06 DIAGNOSIS — I78.8 OTHER DISEASES OF CAPILLARIES: ICD-10-CM

## 2021-12-06 DIAGNOSIS — Z85.828 PERSONAL HISTORY OF OTHER MALIGNANT NEOPLASM OF SKIN: ICD-10-CM

## 2021-12-06 PROBLEM — D18.01 HEMANGIOMA OF SKIN AND SUBCUTANEOUS TISSUE: Status: ACTIVE | Noted: 2021-12-06

## 2021-12-06 PROBLEM — I83.91 ASYMPTOMATIC VARICOSE VEINS OF RIGHT LOWER EXTREMITY: Status: ACTIVE | Noted: 2021-12-06

## 2021-12-06 PROCEDURE — 17000 DESTRUCT PREMALG LESION: CPT

## 2021-12-06 PROCEDURE — ? OTHER

## 2021-12-06 PROCEDURE — ? FULL BODY SKIN EXAM

## 2021-12-06 PROCEDURE — ? LIQUID NITROGEN

## 2021-12-06 PROCEDURE — 17003 DESTRUCT PREMALG LES 2-14: CPT

## 2021-12-06 PROCEDURE — ? ADDITIONAL NOTES

## 2021-12-06 PROCEDURE — 99213 OFFICE O/P EST LOW 20 MIN: CPT | Mod: 25

## 2021-12-06 PROCEDURE — ? COUNSELING

## 2021-12-06 ASSESSMENT — LOCATION SIMPLE DESCRIPTION DERM
LOCATION SIMPLE: CHEST
LOCATION SIMPLE: NOSE
LOCATION SIMPLE: ABDOMEN
LOCATION SIMPLE: RIGHT EAR
LOCATION SIMPLE: RIGHT SCALP
LOCATION SIMPLE: LEFT ANTERIOR AXILLA
LOCATION SIMPLE: LEFT FOREHEAD
LOCATION SIMPLE: LEFT EAR
LOCATION SIMPLE: RIGHT PRETIBIAL REGION

## 2021-12-06 ASSESSMENT — LOCATION DETAILED DESCRIPTION DERM
LOCATION DETAILED: RIGHT LATERAL SUPERIOR CHEST
LOCATION DETAILED: RIGHT LATERAL INFERIOR CHEST
LOCATION DETAILED: NASAL DORSUM
LOCATION DETAILED: RIGHT PROXIMAL PRETIBIAL REGION
LOCATION DETAILED: EPIGASTRIC SKIN
LOCATION DETAILED: LEFT INFERIOR FOREHEAD
LOCATION DETAILED: NASAL TIP
LOCATION DETAILED: LEFT ANTERIOR AXILLA
LOCATION DETAILED: RIGHT SUPERIOR HELIX
LOCATION DETAILED: LEFT SUPERIOR POSTERIOR HELIX
LOCATION DETAILED: LEFT FOREHEAD
LOCATION DETAILED: RIGHT CENTRAL FRONTAL SCALP

## 2021-12-06 ASSESSMENT — LOCATION ZONE DERM
LOCATION ZONE: TRUNK
LOCATION ZONE: NOSE
LOCATION ZONE: FACE
LOCATION ZONE: LEG
LOCATION ZONE: EAR
LOCATION ZONE: SCALP
LOCATION ZONE: AXILLAE

## 2021-12-06 NOTE — PROCEDURE: LIQUID NITROGEN
Detail Level: Detailed
Render Post-Care Instructions In Note?: no
Consent: The patient's consent was obtained including but not limited to risks of crusting, scabbing, blistering, scarring, darker or lighter pigmentary change, recurrence, incomplete removal and infection.
Show Aperture Variable?: Yes
Number Of Freeze-Thaw Cycles: 3 freeze-thaw cycles
Post-Care Instructions: I reviewed with the patient in detail post-care instructions. Patient is to wear sunprotection, and avoid picking at any of the treated lesions. Pt may apply Vaseline to crusted or scabbing areas.
Duration Of Freeze Thaw-Cycle (Seconds): 2

## 2023-04-06 ENCOUNTER — APPOINTMENT (RX ONLY)
Dept: URBAN - METROPOLITAN AREA CLINIC 151 | Facility: CLINIC | Age: 83
Setting detail: DERMATOLOGY
End: 2023-04-06

## 2023-04-06 DIAGNOSIS — Z85.828 PERSONAL HISTORY OF OTHER MALIGNANT NEOPLASM OF SKIN: ICD-10-CM

## 2023-04-06 DIAGNOSIS — L82.1 OTHER SEBORRHEIC KERATOSIS: ICD-10-CM

## 2023-04-06 DIAGNOSIS — D22 MELANOCYTIC NEVI: ICD-10-CM

## 2023-04-06 DIAGNOSIS — L81.4 OTHER MELANIN HYPERPIGMENTATION: ICD-10-CM

## 2023-04-06 DIAGNOSIS — D18.0 HEMANGIOMA: ICD-10-CM

## 2023-04-06 PROBLEM — D18.01 HEMANGIOMA OF SKIN AND SUBCUTANEOUS TISSUE: Status: ACTIVE | Noted: 2023-04-06

## 2023-04-06 PROBLEM — D22.5 MELANOCYTIC NEVI OF TRUNK: Status: ACTIVE | Noted: 2023-04-06

## 2023-04-06 PROCEDURE — ? COUNSELING

## 2023-04-06 PROCEDURE — 99213 OFFICE O/P EST LOW 20 MIN: CPT

## 2023-04-06 PROCEDURE — ? FULL BODY SKIN EXAM

## 2023-04-06 PROCEDURE — ? TREATMENT REGIMEN

## 2023-04-06 ASSESSMENT — LOCATION DETAILED DESCRIPTION DERM
LOCATION DETAILED: LEFT ANTERIOR DISTAL THIGH
LOCATION DETAILED: RIGHT ANTERIOR DISTAL THIGH
LOCATION DETAILED: LEFT MEDIAL SUPERIOR CHEST
LOCATION DETAILED: SUPERIOR THORACIC SPINE
LOCATION DETAILED: STERNUM
LOCATION DETAILED: LEFT SUPERIOR UPPER BACK
LOCATION DETAILED: LEFT INFERIOR CENTRAL MALAR CHEEK
LOCATION DETAILED: INFERIOR THORACIC SPINE
LOCATION DETAILED: PERIUMBILICAL SKIN
LOCATION DETAILED: RIGHT INFERIOR CENTRAL MALAR CHEEK
LOCATION DETAILED: RIGHT SUPERIOR MEDIAL MIDBACK

## 2023-04-06 ASSESSMENT — LOCATION SIMPLE DESCRIPTION DERM
LOCATION SIMPLE: UPPER BACK
LOCATION SIMPLE: ABDOMEN
LOCATION SIMPLE: RIGHT LOWER BACK
LOCATION SIMPLE: LEFT CHEEK
LOCATION SIMPLE: LEFT THIGH
LOCATION SIMPLE: RIGHT THIGH
LOCATION SIMPLE: RIGHT CHEEK
LOCATION SIMPLE: LEFT UPPER BACK
LOCATION SIMPLE: CHEST

## 2023-04-06 ASSESSMENT — LOCATION ZONE DERM
LOCATION ZONE: LEG
LOCATION ZONE: TRUNK
LOCATION ZONE: FACE

## 2023-06-14 NOTE — RESULT ENCOUNTER NOTE
Brief Operative Note    Patient: Leo Merlos 54 year old male    MRN: [id number]    Surgeon(s): Blayne Dominguez MD  Phone Number: [phone]                       Surgeon(s) and Role:     * Blayne Dominguez MD - Primary    Assistant(s): Laquita    Pre-Op Diagnosis: Carpal tunnel syndrome of right wrist [G56.01]     Post-Op Diagnosis: same     Procedure: Procedure(s):  RIGHT ENDOSCOPIC CARPAL TUNNEL RELEASE    Anesthesia Type: MAC                                   Complications: None    Description: none    Findings: none    Specimens Removed: No specimens collected     Estimated Blood Loss: No Value     Assistant Tasks: Opening and closing     Implants: * No implants in log *      I was present for the key portions of the procedure and was immediately available for the non-key portions         DERMATOPATHOLOGY RESULT NOTE    Results reviewed by ordering physician  Called patient to personally discuss results  No answer x2 calls, left voicemail with results on second call; patient will need Mohs for SPECIMEN A:  Right Supra-Nasal Tip and needs nothing for Specimen B:  Right Auricle  Instructions for Clinical Derm Team:   (remember to route Result Note to appropriate staff):    Refer patient to Mohs surgery for SPECIMEN A:  Right Supra-Nasal Tip  Please confirm with me once he is scheduled for Mohs  Thanks! Result & Plan by Specimen:    Specimen A: malignant  Plan: MOHs      Specimen B: benign  Plan: reassured, benign      Tissue Exam: [id number]   Order: [id number]   Status:  Final result   Visible to patient:  No (not released) Dx:  Neoplasm of uncertain behavior   Component    Case Report   Surgical Pathology Report                         Case: [id number]                                    Authorizing Provider: Aurora Kapadia MD     Collected:           09/14/2020 1012               Ordering Location:     [facility]      Received:            09/14/2020 1012                                      [place]                                                                        Pathologist:           Hemant Scherer MD                                                            Specimens:   A) - Skin, Other, Specimen A:  Shave Biopsy; Right Supra-Nasal Tip                                   B) - Skin, Other, B: Right auricle                                                        Final Diagnosis   A  Skin, right supra-nasal tip, shave biopsy:     BASAL CELL CARCINOMA, NODULAR TYPE; extending to the tissue edges           B   Skin, right auricle, shave biopsy:     Consistent with LICHENOID KERATOSIS (see note)      Note: Multiple levels examined                     Electronically signed by Hemant Scherer MD on 9/17/2020 at  5:41 PM   Additional Information    All reported additional testing was performed with appropriately reactive controls   These tests were developed and their performance characteristics determined by [facility] or appropriate performing facility, though some tests may be performed on tissues which have not been validated for performance characteristics (such as staining performed on alcohol exposed cell blocks and decalcified tissues)   Results should be interpreted with caution and in the context of the patients' clinical condition  These tests may not be cleared or approved by the [place]  Food and Drug Administration, though the FDA has determined that such clearance or approval is not necessary  These tests are used for clinical purposes and they should not be regarded as investigational or for research  This laboratory has been approved by [place] [address], designated as a high-complexity laboratory and is qualified to perform these tests  Hanh Gallagher Description    A  The specimen is received in formalin, labeled with the patient's name and hospital number, and is designated "right supra nasal tip"     Specimen consists of a 0 7 x 0 5 cm shave of tan skin excised to a depth of 0 1 cm  The epithelial surface is tan papule  The skin surface is inked red and the resection margin is inked green  The specimen is bisected  Entirely submitted  One cassette  Between sponges        B  The specimen is received in formalin, labeled with the patient's name and hospital number, and is designated "right auricle"  Specimen consists of a 0 7 x 0 6 cm shave of tan skin excised to the depth 0 1 cm  The epithelial surface is tan purple, verrucous papule  The skin surface is inked red and the resection margin is inked green  The specimen is bisected  Entirely submitted  One cassette    Between sponges      Note: The estimated total formalin fixation time based upon information provided by the submitting clinician and the standard processing schedule is under 72 hours      [facility]    Clinical Information    ATTENTION:  Dr Shannon Rodriguez     Relevant Clinical History:   Patient is 78years old with history of Basal cell carcinoma on nose  Specimen Letter:  A:   Specimen Type: Skin   Anatomical Location: Right supranasal tip   Procedure Type: Tangential Biopsy (shave, scoop, saucerization, curette)   Size of Lesion: 0 6cm   Margins (if any): Morphological Description (including dermoscopy findings): round firm somewhat discrete papule   Differential Diagnosis/Specific Clinical Question: Rule out scc vs bcc   Association with previous path ([facility] Accession #):   Tagged with suture at following position:       ----------------------------   Resulting Agency  [facility]       Specimen Collected: 09/14/20 10:27 AM  Last Resulted: 09/17/20  5:41 PM      Order Details      View Encounter      Lab and Collection Details      Routing      Result History         Scans on Order [id number]       Lab Result Document - Document on 9/17/2020  5:41 PM           Tissue Exam: [id number]   Order: [id number]   Status:  Final result   Visible to patient:  No (not released) Dx:  Neoplasm of uncertain behavior   Component    Case Report   Surgical Pathology Report                         Case: [id number]                                    Authorizing Provider: Kinga Zhao MD     Collected:           09/14/2020 1012               Ordering Location:     [facility]      Received:            09/14/2020 [facility]                                      [place]                                                                        Pathologist:           Shannon Rodriguez MD                                                            Specimens:   A) - Skin, Other, Specimen A:  Shave Biopsy; Right Supra-Nasal Tip                                   B) - Skin, Other, B: Right auricle                                                        Final Diagnosis   A   Skin, right supra-nasal tip, shave biopsy:     BASAL CELL CARCINOMA, NODULAR TYPE; extending to the tissue edges           B  Skin, right auricle, shave biopsy:     Consistent with LICHENOID KERATOSIS (see note)      Note: Multiple levels examined                     Electronically signed by Devante Jones MD on 9/17/2020 at  5:41 PM   Additional Information    All reported additional testing was performed with appropriately reactive controls   These tests were developed and their performance characteristics determined by [address] [place] [address] [facility] or appropriate performing facility, though some tests may be performed on tissues which have not been validated for performance characteristics (such as staining performed on alcohol exposed cell blocks and decalcified tissues)   Results should be interpreted with caution and in the context of the patients' clinical condition  These tests may not be cleared or approved by the [place]  Food and Drug Administration, though the FDA has determined that such clearance or approval is not necessary  These tests are used for clinical purposes and they should not be regarded as investigational or for research  This laboratory has been approved by [facility], designated as a high-complexity laboratory and is qualified to perform these tests  Zachery Persaud Description    A  The specimen is received in formalin, labeled with the patient's name and hospital number, and is designated "right supra nasal tip"     Specimen consists of a 0 7 x 0 5 cm shave of tan skin excised to a depth of 0 1 cm  The epithelial surface is tan papule  The skin surface is inked red and the resection margin is inked green  The specimen is bisected  Entirely submitted  One cassette  Between sponges        B  The specimen is received in formalin, labeled with the patient's name and hospital number, and is designated "right auricle"  Specimen consists of a 0 7 x 0 6 cm shave of tan skin excised to the depth 0 1 cm    The epithelial surface is tan purple, verrucous papule  The skin surface is inked red and the resection margin is inked green  The specimen is bisected  Entirely submitted  One cassette  Between sponges      Note: The estimated total formalin fixation time based upon information provided by the submitting clinician and the standard processing schedule is under 72 hours      [facility]       Clinical Information    ATTENTION:  Dr Shaunna Tao     Relevant Clinical History:   Patient is 78years old with history of Basal cell carcinoma on nose  Specimen Letter:  A:   Specimen Type: Skin   Anatomical Location: Right supranasal tip   Procedure Type: Tangential Biopsy (shave, scoop, saucerization, curette)   Size of Lesion: 0 6cm   Margins (if any): Morphological Description (including dermoscopy findings): round firm somewhat discrete papule   Differential Diagnosis/Specific Clinical Question: Rule out scc vs bcc      Association with previous path ([facility] Accession #):   Tagged with suture at following position:       ----------------------------   Resulting Agency  [facility]       Specimen Collected: 09/14/20 10:12 AM  Last Resulted: 09/17/20  5:41 PM

## 2025-04-30 ENCOUNTER — APPOINTMENT (OUTPATIENT)
Dept: RADIOLOGY | Facility: MEDICAL CENTER | Age: 85
End: 2025-04-30
Attending: SPECIALIST
Payer: COMMERCIAL

## 2025-04-30 ENCOUNTER — HOSPITAL ENCOUNTER (OUTPATIENT)
Dept: RADIOLOGY | Facility: MEDICAL CENTER | Age: 85
Discharge: HOME | End: 2025-04-30
Attending: SPECIALIST
Payer: COMMERCIAL

## 2025-04-30 DIAGNOSIS — N20.0 RENAL CALCULUS: ICD-10-CM

## 2025-04-30 PROCEDURE — 74018 RADEX ABDOMEN 1 VIEW: CPT

## 2025-04-30 PROCEDURE — 76775 US EXAM ABDO BACK WALL LIM: CPT

## 2025-05-06 ENCOUNTER — PATIENT OUTREACH (OUTPATIENT)
Dept: HEMATOLOGY ONCOLOGY | Facility: CLINIC | Age: 85
End: 2025-05-06

## 2025-05-06 ENCOUNTER — DOCUMENTATION (OUTPATIENT)
Dept: HEMATOLOGY ONCOLOGY | Facility: CLINIC | Age: 85
End: 2025-05-06

## 2025-05-06 NOTE — PROGRESS NOTES
Initial outreach.  Called and spoke to patient's wife, Shira.  Introduced myself and explained the role of a Nurse Navigator.  Assessed for barriers of care.     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 The patient would like to establish care with [facility] for surveillance purposes as Shira does not know if she and the patient will ever be returning to [place].  Shira noted the patient's last PET/CT was done in January and he was PROMISE.      I informed Shira I would have someone call her to schedule an appointment with Dr. Emmanuel to establish follow-up.  She was very appreciative.

## 2025-05-06 NOTE — PROGRESS NOTES
All records needed are in patients chart. No records retrieval needed at this time.    Pt self-referred to Medical Oncology to establish care for hx of H/N cancer and should be scheduled next available.     Referral received/ Chart reviewed for work up completed     Imaging completed:    [] PET/CT   [] MRI   [] CT   [] US   [] Mammo   [] Bone scan   [] N/A    Pathology completed:    Date:   Location:   []N/A    Additional records needed:    [] Genomic report   [] Genetic testing results   [] Office Note   [] Procedure/ Operative note   [] Lab results   [] N/A      [] Radiation Oncology records retrieval needed (PN to route to rad/onc clerical pool once scheduled)  Date:  Location:

## 2025-06-05 ENCOUNTER — OFFICE VISIT (OUTPATIENT)
Dept: CARDIOLOGY CLINIC | Facility: MEDICAL CENTER | Age: 85
End: 2025-06-05
Payer: COMMERCIAL

## 2025-06-05 VITALS
HEART RATE: 60 BPM | SYSTOLIC BLOOD PRESSURE: 118 MMHG | HEIGHT: 67 IN | WEIGHT: 124 LBS | BODY MASS INDEX: 19.46 KG/M2 | DIASTOLIC BLOOD PRESSURE: 60 MMHG | OXYGEN SATURATION: 99 %

## 2025-06-05 DIAGNOSIS — N52.9 ERECTILE DYSFUNCTION, UNSPECIFIED ERECTILE DYSFUNCTION TYPE: ICD-10-CM

## 2025-06-05 DIAGNOSIS — E78.5 DYSLIPIDEMIA: ICD-10-CM

## 2025-06-05 DIAGNOSIS — I48.19 PERSISTENT ATRIAL FIBRILLATION (HCC): Primary | ICD-10-CM

## 2025-06-05 PROCEDURE — 99203 OFFICE O/P NEW LOW 30 MIN: CPT | Performed by: INTERNAL MEDICINE

## 2025-06-05 RX ORDER — METOPROLOL SUCCINATE 25 MG/1
1 TABLET, EXTENDED RELEASE ORAL DAILY
COMMUNITY
Start: 2025-03-28

## 2025-06-05 RX ORDER — DORZOLAMIDE HCL 20 MG/ML
SOLUTION/ DROPS OPHTHALMIC
COMMUNITY

## 2025-06-05 RX ORDER — SILDENAFIL 50 MG/1
50 TABLET, FILM COATED ORAL DAILY PRN
Qty: 10 TABLET | Refills: 3 | Status: SHIPPED | OUTPATIENT
Start: 2025-06-05

## 2025-06-05 NOTE — PROGRESS NOTES
Cardiology Follow Up    Braeden Simpson  1940  [id number]  [facility]  [address] [facility]  [place] [address] [phone]      Discussion: Mr. Simpson is asymptomatic.  Stable persistent atrial fibrillation.  Well-controlled ventricular response.  Currently on rivaroxaban.  Echocardiography was ordered to be performed in 3 months.  Lipid panel was ordered to be performed in 3 months.  I will see him in follow-up in 6 months.  There are no cardiac contraindications to anesthesia or surgery.    Cardiovascular History:   Mr. Simpson was found to be in atrial fibrillation in  at the time of assessment of squamous cell carcinoma of the skin.  He has no symptoms.  He is on metoprolol for rate control and rivaroxaban anticoagulation.  There was no past history of overt heart disease.  There are no symptoms suggestive of angina or congestive heart failure.  He does not have diabetes.  He has hypertension, well-controlled on medical therapy.  His cholesterol status is unknown.  The patient and his wife deny a history of dyslipidemia, but in the past he was on atorvastatin.  He smoked in the distant past, but has not smoked in 60 years.  He is sedentary.  At the time of his initial assessment, he was being evaluated for treatment of nephrolithiasis.    Problem List[1]  Past Medical History[2]  Social History     Socioeconomic History    Marital status: /Civil Union     Spouse name: Not on file    Number of children: Not on file    Years of education: Not on file    Highest education level: Not on file   Occupational History    Not on file   Tobacco Use    Smoking status: Former     Current packs/day: 0.00     Types: Cigarettes     Quit date: 1968     Years since quittin.7    Smokeless tobacco: Never   Vaping Use    Vaping status: Never Used   Substance and Sexual Activity    Alcohol use: Yes     Comment: social    Drug  use: Never    Sexual activity: Not on file   Other Topics Concern    Not on file   Social History Narrative    Not on file     Social Drivers of Health     Financial Resource Strain: Not on file   Food Insecurity: Not on file   Transportation Needs: Not on file   Physical Activity: Not on file   Stress: Not on file   Social Connections: Not on file   Intimate Partner Violence: Not on file   Housing Stability: Not on file      Family History[3]  Past Surgical History[4]  Current Medications[5]  No Known Allergies    Labs: personally reviewed all pertinent labs  Imaging:  personally reviewed all pertinent imaging  Cath:  ECHO:  Stress:  Holter:    Review of Systems:  Review of Systems   Constitutional: Negative.   HENT: Negative.     Eyes: Negative.    Cardiovascular: Negative.    Respiratory: Negative.     Endocrine: Negative.    Hematologic/Lymphatic: Negative.    Skin: Negative.    Musculoskeletal: Negative.    Gastrointestinal: Negative.    Genitourinary: Negative.    Neurological: Negative.    Psychiatric/Behavioral: Negative.     Allergic/Immunologic: Negative.    All other systems reviewed and are negative.      Vitals:    06/05/25 0950   BP: 118/60   Pulse: 60   SpO2: 99%     Weight (last 2 days)       Date/Time Weight    06/05/25 0950 56.2 (124)            Physical Exam:  Physical Exam  Vitals reviewed.   Constitutional:       General: He is not in acute distress.     Appearance: He is well-developed. He is not diaphoretic.   HENT:      Head: Normocephalic and atraumatic.     Eyes:      General: No scleral icterus.     Conjunctiva/sclera: Conjunctivae normal.     Neck:      Vascular: No JVD.      Trachea: No tracheal deviation.     Cardiovascular:      Rate and Rhythm: Normal rate and regular rhythm.      Pulses: Intact distal pulses.      Heart sounds: Normal heart sounds. No murmur heard.     No friction rub. No gallop.   Pulmonary:      Effort: Pulmonary effort is normal. No respiratory distress.       Breath sounds: Normal breath sounds. No stridor. No wheezing or rales.   Chest:      Chest wall: No tenderness.   Abdominal:      General: Bowel sounds are normal. There is no distension.      Palpations: Abdomen is soft.      Tenderness: There is no abdominal tenderness.     Musculoskeletal:         General: No tenderness. Normal range of motion.      Cervical back: Normal range of motion and neck supple.     Skin:     General: Skin is warm and dry.      Findings: No erythema.     Neurological:      Mental Status: He is alert and oriented to person, place, and time.      Cranial Nerves: No cranial nerve deficit.      Coordination: Coordination normal.     Psychiatric:         Behavior: Behavior normal.         Thought Content: Thought content normal.         Judgment: Judgment normal.         Arvind Yuen MD         [1] There is no problem list on file for this patient.  [2]   Past Medical History:  Diagnosis Date    Basal cell carcinoma 2006    BCC (basal cell carcinoma of skin) 09/14/2020    right supra nasal tip   [3]   Family History  Problem Relation Name Age of Onset    Prostate cancer Father     [4]   Past Surgical History:  Procedure Laterality Date    MOHS SURGERY  2006    MOHS SURGERY  10/27/2020    right supra sal tip   [5]   Current Outpatient Medications:     dorzolamide (TRUSOPT) 2 % ophthalmic solution, INSTILL 1 DROP INTO AFFECTED EYE(S) BY OPHTHALMIC ROUTE 3 TIMES PER DAY, Disp: , Rfl:     metoprolol succinate (TOPROL-XL) 25 mg 24 hr tablet, Take 1 tablet by mouth daily, Disp: , Rfl:     rivaroxaban (Xarelto) 15 mg tablet, Take 15 mg by mouth, Disp: , Rfl:     timolol (BETIMOL) 0.5 % ophthalmic solution, 1 drop in the morning and 1 drop in the evening., Disp: , Rfl:     amLODIPine-atorvastatin (CADUET) 5-10 MG per tablet, Take 1 tablet by mouth daily (Patient not taking: Reported on 6/5/2025), Disp: , Rfl:     aspirin (ECOTRIN LOW STRENGTH) 81 mg EC tablet, Take 81 mg by mouth daily (Patient not  taking: Reported on 6/5/2025), Disp: , Rfl:     bimatoprost (LUMIGAN) 0.01 % ophthalmic drops, 1 drop daily at bedtime (Patient not taking: Reported on 6/5/2025), Disp: , Rfl:     cholecalciferol (VITAMIN D3) 1,000 units tablet, Take 1,000 Units by mouth daily (Patient not taking: Reported on 6/5/2025), Disp: , Rfl:

## 2025-06-19 RX ORDER — MULTIVITAMIN WITH IRON
TABLET ORAL DAILY
COMMUNITY

## 2025-06-19 RX ORDER — BENZOYL PEROXIDE 10 G/100G
GEL TOPICAL DAILY
COMMUNITY

## 2025-06-19 RX ORDER — MAGNESIUM 30 MG
30 TABLET ORAL 2 TIMES DAILY
Status: ON HOLD | COMMUNITY
End: 2025-06-26

## 2025-06-19 RX ORDER — DIPHENOXYLATE HYDROCHLORIDE AND ATROPINE SULFATE 2.5; .025 MG/1; MG/1
1 TABLET ORAL DAILY
Status: ON HOLD | COMMUNITY
End: 2025-06-26

## 2025-06-19 NOTE — PRE-PROCEDURE INSTRUCTIONS
Pre-Surgery Instructions:   Medication Instructions    benzoyl peroxide 10 % gel Uses PRN- DO NOT take day of surgery    bimatoprost (LUMIGAN) 0.01 % ophthalmic drops Take day of surgery.    cholecalciferol (VITAMIN D3) 1,000 units tablet Stop taking 5 days prior to surgery.    dorzolamide (TRUSOPT) 2 % ophthalmic solution Take day of surgery.    magnesium 30 MG tablet Stop taking 5 days prior to surgery.    metoprolol succinate (TOPROL-XL) 25 mg 24 hr tablet Take day of surgery.    Misc Natural Products (LUTEIN 20 PO) Stop taking 5 days prior to surgery.    multivitamin (THERAGRAN) TABS Stop taking 5 days prior to surgery.    rivaroxaban (Xarelto) 15 mg tablet Instructions provided by MD- patient aware RN will call sx office for instructions          vitamin B-12 (CYANOCOBALAMIN) 50 MCG tablet Stop taking 5 days prior to surgery.   Medication instructions for day of surgery reviewed. Please take all instructed medications with only a sip of water. Please do not take any over the counter (non-prescribed) vitamins or supplements for one week prior to date of surgery.      You will receive a call one business day prior to surgery with an arrival time and hospital directions. If your surgery is scheduled on a Monday, the hospital will be calling you on the Friday prior to your surgery. If you have not heard from anyone by 8pm, please call the hospital supervisor through the hospital  at [phone]. ([place] [phone] or [place] [phone]).    Do not eat or drink anything after midnight the night before your surgery, including candy, mints, lifesavers, or chewing gum. Do not drink alcohol 24hrs before your surgery. Try not to smoke at least 24hrs before your surgery.       Follow the pre surgery showering instructions as listed in the “My Surgical Experience Booklet” or otherwise provided by your surgeon's office. Do not use a blade to shave the surgical area 1 week before surgery. It is okay to use a  clean electric clippers up to 24 hours before surgery. Do not apply any lotions, creams, including makeup, cologne, deodorant, or perfumes after showering on the day of your surgery. Do not use dry shampoo, hair spray, hair gel, or any type of hair products.     No contact lenses, eye make-up, or artificial eyelashes. Remove nail polish, including gel polish, and any artificial, gel, or acrylic nails if possible. Remove all jewelry including rings and body piercing jewelry.     Wear causal clothing that is easy to take on and off. Consider your type of surgery.    Keep any valuables, jewelry, piercings at home. Please bring any specially ordered equipment (sling, braces) if indicated.    Arrange for a responsible person to drive you to and from the hospital on the day of your surgery. Please confirm the visitor policy for the day of your procedure when you receive your phone call with an arrival time.     Call the surgeon's office with any new illnesses, exposures, or additional questions prior to surgery.    Please reference your “My Surgical Experience Booklet” for additional information to prepare for your upcoming surgery.    Pt. Verbalized an understanding of all instructions reviewed and offers no concerns at this time.

## 2025-06-23 NOTE — H&P
"H&P Exam - Urology       Patient: Braeden Simpson   : 1940 Sex: male   MRN: [id number]     CSN: [id number]      History of Present Illness   HPI:  Braeden Simpson is a 84 y.o. male who presents 9 mm left renal stone mild left flank pain from time to time wishing to undergo cystoscopy left ureteroscopy laser gypsy stent placement aware risk of anesthesia infection bleeding additional Yannes procedures        Review of Systems:   Constitutional:  Negative for activity change, fever, chills and diaphoresis.   HENT: Negative for hearing loss and trouble swallowing.   Eyes: Negative for itching and visual disturbance.   Respiratory: Negative for chest tightness and shortness of breath.   Cardiovascular: Negative for chest pain, edema.   Gastrointestinal: Negative for abdominal distention, na abdominal pain, constipation, diarrhea, Nausea and vomiting.   Genitourinary: Negative for decreased urine volume, difficulty urinating, dysuria, enuresis, frequency, hematuria and urgency.   Musculoskeletal: Negative for gait problem and myalgias.   Neurological: Negative for dizziness and headaches.   Hematological: Does not bruise/bleed easily.       Historical Information   Past Medical History[1]  Past Surgical History[2]  Social History   Social History     Substance and Sexual Activity   Alcohol Use Yes    Comment: social     Social History     Substance and Sexual Activity   Drug Use Never     Tobacco Use History[3]  Family History: Family History[4]    Meds/Allergies   No medications prior to admission.  Allergies[5]    Objective   Vitals: Ht 5' 6\" (1.676 m)   Wt 56.2 kg (124 lb)   BMI 20.01 kg/m²     Physical Exam:  General Alert awake   Normocephalic atraumatic PERRLA  Lungs clear bilaterally  Cardiac normal S1 normal S2  Abdomen soft, flank pain  Extremities no edema    No intake/output data recorded.    Invasive Devices       None                       Lab Results: CBC:   Lab Results   Component Value Date    WBC " "5.7 2017    HGB 12.4 (L) 2017    HCT 37.7 (L) 2017    MCV 92.0 2017     2017    RBC 4.10 (L) 2017    MCH 30.2 2017    MCHC 32.9 2017    RDW 13.5 2017    MPV 9.6 2017     CMP:   Lab Results   Component Value Date     2017     2017    CO2 30 2017    BUN 18 2017    CREATININE 0.98 2017    CALCIUM 9.8 2017    AST 42 (H) 2017    ALT 46 2017    ALKPHOS 66 2017    PROT 6.6 2017    BILITOT 0.6 2017     Urinalysis: No results found for: \"COLORU\", \"CLARITYU\", \"SPECGRAV\", \"PHUR\", \"LEUKOCYTESUR\", \"NITRITE\", \"PROTEINUA\", \"GLUCOSEU\", \"KETONESU\", \"BILIRUBINUR\", \"BLOODU\"  Urine Culture: No results found for: \"URINECX\"  PSA: No results found for: \"PSA\"        Assessment/ Plan:  Cystoscopy left ureteroscopy laser basket stent      Dov Gallegos MD       [1]   Past Medical History:  Diagnosis Date    A-fib (HCC)     Basal cell carcinoma     BCC (basal cell carcinoma of skin) 2020    right supra nasal tip    CPAP (continuous positive airway pressure) dependence     Sleep apnea     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

## 2025-06-25 ENCOUNTER — ANESTHESIA EVENT (OUTPATIENT)
Dept: PERIOP | Facility: HOSPITAL | Age: 85
End: 2025-06-25
Payer: COMMERCIAL

## 2025-06-25 PROBLEM — I48.91 ATRIAL FIBRILLATION (HCC): Status: ACTIVE | Noted: 2025-06-25

## 2025-06-25 PROBLEM — I10 HTN (HYPERTENSION): Status: ACTIVE | Noted: 2025-06-25

## 2025-06-25 PROBLEM — G47.30 SLEEP APNEA: Status: ACTIVE | Noted: 2025-06-25

## 2025-06-25 PROBLEM — E78.5 HYPERLIPIDEMIA: Status: ACTIVE | Noted: 2025-06-25

## 2025-06-25 NOTE — ANESTHESIA PREPROCEDURE EVALUATION
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      Other Findings        Anesthesia Plan  ASA Score- 3     Anesthesia Type- general with ASA Monitors.         Additional Monitors:     Airway Plan: LMA and LMA.           Plan Factors-    Chart reviewed.        Patient is not a current smoker.              Induction- intravenous.    Postoperative Plan- Plan for postoperative opioid use.   Monitoring Plan - Monitoring plan - standard ASA monitoring  Post Operative Pain Plan - plan for postoperative opioid use    Perioperative Resuscitation Plan - Level 1 - Full Code.       Informed Consent- Anesthetic plan and risks discussed with patient.  I personally reviewed this patient with the CRNA. Discussed and agreed on the Anesthesia Plan with the CRNA..      NPO Status:  No vitals data found for the desired time range.

## 2025-06-26 ENCOUNTER — HOSPITAL ENCOUNTER (OUTPATIENT)
Facility: HOSPITAL | Age: 85
Setting detail: OUTPATIENT SURGERY
Discharge: HOME | End: 2025-06-27
Attending: SPECIALIST | Admitting: SPECIALIST
Payer: COMMERCIAL

## 2025-06-26 ENCOUNTER — APPOINTMENT (OUTPATIENT)
Dept: RADIOLOGY | Facility: HOSPITAL | Age: 85
End: 2025-06-26
Payer: COMMERCIAL

## 2025-06-26 ENCOUNTER — ANESTHESIA (OUTPATIENT)
Dept: PERIOP | Facility: HOSPITAL | Age: 85
End: 2025-06-26
Payer: COMMERCIAL

## 2025-06-26 DIAGNOSIS — N20.0 CALCULUS OF KIDNEY: Primary | ICD-10-CM

## 2025-06-26 DIAGNOSIS — I10 HTN (HYPERTENSION): ICD-10-CM

## 2025-06-26 LAB
ANION GAP SERPL CALCULATED.3IONS-SCNC: 11 MMOL/L (ref 4–13)
BUN SERPL-MCNC: 27 MG/DL (ref 5–25)
CALCIUM SERPL-MCNC: 10.1 MG/DL (ref 8.4–10.2)
CHLORIDE SERPL-SCNC: 105 MMOL/L (ref 96–108)
CO2 SERPL-SCNC: 22 MMOL/L (ref 21–32)
CREAT SERPL-MCNC: 0.87 MG/DL (ref 0.6–1.3)
ERYTHROCYTE [DISTWIDTH] IN BLOOD BY AUTOMATED COUNT: 13.9 % (ref 11.6–15.1)
GFR SERPL CREATININE-BSD FRML MDRD: 78 ML/MIN/1.73SQ M
GLUCOSE SERPL-MCNC: 166 MG/DL (ref 65–140)
HCT VFR BLD AUTO: 37.6 % (ref 36.5–49.3)
HGB BLD-MCNC: 11.9 G/DL (ref 12–17)
MCH RBC QN AUTO: 29.8 PG (ref 26.8–34.3)
MCHC RBC AUTO-ENTMCNC: 31.6 G/DL (ref 31.4–37.4)
MCV RBC AUTO: 94 FL (ref 82–98)
PLATELET # BLD AUTO: 178 THOUSANDS/UL (ref 149–390)
PMV BLD AUTO: 8.9 FL (ref 8.9–12.7)
POTASSIUM SERPL-SCNC: 4.6 MMOL/L (ref 3.5–5.3)
RBC # BLD AUTO: 3.99 MILLION/UL (ref 3.88–5.62)
SODIUM SERPL-SCNC: 138 MMOL/L (ref 135–147)
WBC # BLD AUTO: 8.99 THOUSAND/UL (ref 4.31–10.16)

## 2025-06-26 PROCEDURE — 99221 1ST HOSP IP/OBS SF/LOW 40: CPT | Performed by: FAMILY MEDICINE

## 2025-06-26 PROCEDURE — 85027 COMPLETE CBC AUTOMATED: CPT | Performed by: FAMILY MEDICINE

## 2025-06-26 PROCEDURE — 82360 CALCULUS ASSAY QUANT: CPT | Performed by: SPECIALIST

## 2025-06-26 PROCEDURE — C1747 URETEROSCOPE DIGITAL FLEX SNGL USE STD DEFLECTION APTRA: HCPCS | Performed by: SPECIALIST

## 2025-06-26 PROCEDURE — 74420 UROGRAPHY RTRGR +-KUB: CPT

## 2025-06-26 PROCEDURE — C1769 GUIDE WIRE: HCPCS | Performed by: SPECIALIST

## 2025-06-26 PROCEDURE — C2617 STENT, NON-COR, TEM W/O DEL: HCPCS | Performed by: SPECIALIST

## 2025-06-26 PROCEDURE — C1894 INTRO/SHEATH, NON-LASER: HCPCS | Performed by: SPECIALIST

## 2025-06-26 PROCEDURE — 80048 BASIC METABOLIC PNL TOTAL CA: CPT | Performed by: FAMILY MEDICINE

## 2025-06-26 DEVICE — INLAY URETERAL STENT W/O GUIDEWIRE
Type: IMPLANTABLE DEVICE | Site: URETER | Status: FUNCTIONAL
Brand: BARD® INLAY® URETERAL STENT

## 2025-06-26 RX ORDER — FENTANYL CITRATE/PF 50 MCG/ML
25 SYRINGE (ML) INJECTION
Status: DISCONTINUED | OUTPATIENT
Start: 2025-06-26 | End: 2025-06-26 | Stop reason: HOSPADM

## 2025-06-26 RX ORDER — MAGNESIUM HYDROXIDE/ALUMINUM HYDROXICE/SIMETHICONE 120; 1200; 1200 MG/30ML; MG/30ML; MG/30ML
30 SUSPENSION ORAL EVERY 6 HOURS PRN
Status: DISCONTINUED | OUTPATIENT
Start: 2025-06-26 | End: 2025-06-27 | Stop reason: HOSPADM

## 2025-06-26 RX ORDER — FENTANYL CITRATE 50 UG/ML
INJECTION, SOLUTION INTRAMUSCULAR; INTRAVENOUS AS NEEDED
Status: DISCONTINUED | OUTPATIENT
Start: 2025-06-26 | End: 2025-06-26

## 2025-06-26 RX ORDER — LIDOCAINE HYDROCHLORIDE 10 MG/ML
INJECTION, SOLUTION EPIDURAL; INFILTRATION; INTRACAUDAL; PERINEURAL AS NEEDED
Status: DISCONTINUED | OUTPATIENT
Start: 2025-06-26 | End: 2025-06-26

## 2025-06-26 RX ORDER — DORZOLAMIDE HCL 20 MG/ML
1 SOLUTION/ DROPS OPHTHALMIC 3 TIMES DAILY
Status: DISCONTINUED | OUTPATIENT
Start: 2025-06-26 | End: 2025-06-27 | Stop reason: HOSPADM

## 2025-06-26 RX ORDER — LEVOFLOXACIN 5 MG/ML
500 INJECTION, SOLUTION INTRAVENOUS EVERY 24 HOURS
Status: COMPLETED | OUTPATIENT
Start: 2025-06-26 | End: 2025-06-27

## 2025-06-26 RX ORDER — MAGNESIUM HYDROXIDE 1200 MG/15ML
LIQUID ORAL AS NEEDED
Status: DISCONTINUED | OUTPATIENT
Start: 2025-06-26 | End: 2025-06-26 | Stop reason: HOSPADM

## 2025-06-26 RX ORDER — SODIUM CHLORIDE, SODIUM LACTATE, POTASSIUM CHLORIDE, CALCIUM CHLORIDE 600; 310; 30; 20 MG/100ML; MG/100ML; MG/100ML; MG/100ML
75 INJECTION, SOLUTION INTRAVENOUS CONTINUOUS
Status: DISCONTINUED | OUTPATIENT
Start: 2025-06-26 | End: 2025-06-27 | Stop reason: HOSPADM

## 2025-06-26 RX ORDER — CEFAZOLIN SODIUM 2 G/50ML
2000 SOLUTION INTRAVENOUS ONCE
Status: COMPLETED | OUTPATIENT
Start: 2025-06-26 | End: 2025-06-26

## 2025-06-26 RX ORDER — METOPROLOL SUCCINATE 25 MG/1
25 TABLET, EXTENDED RELEASE ORAL DAILY
Status: DISCONTINUED | OUTPATIENT
Start: 2025-06-27 | End: 2025-06-27 | Stop reason: HOSPADM

## 2025-06-26 RX ORDER — PROPOFOL 10 MG/ML
INJECTION, EMULSION INTRAVENOUS AS NEEDED
Status: DISCONTINUED | OUTPATIENT
Start: 2025-06-26 | End: 2025-06-26

## 2025-06-26 RX ORDER — BENZOYL PEROXIDE 10 G/100G
GEL TOPICAL DAILY
Status: DISCONTINUED | OUTPATIENT
Start: 2025-06-27 | End: 2025-06-27 | Stop reason: HOSPADM

## 2025-06-26 RX ORDER — ONDANSETRON 2 MG/ML
INJECTION INTRAMUSCULAR; INTRAVENOUS AS NEEDED
Status: DISCONTINUED | OUTPATIENT
Start: 2025-06-26 | End: 2025-06-26

## 2025-06-26 RX ORDER — DEXAMETHASONE SODIUM PHOSPHATE 10 MG/ML
INJECTION, SOLUTION INTRAMUSCULAR; INTRAVENOUS AS NEEDED
Status: DISCONTINUED | OUTPATIENT
Start: 2025-06-26 | End: 2025-06-26

## 2025-06-26 RX ORDER — HYDRALAZINE HYDROCHLORIDE 20 MG/ML
10 INJECTION INTRAMUSCULAR; INTRAVENOUS ONCE
Status: DISCONTINUED | OUTPATIENT
Start: 2025-06-26 | End: 2025-06-27 | Stop reason: HOSPADM

## 2025-06-26 RX ORDER — ONDANSETRON 2 MG/ML
4 INJECTION INTRAMUSCULAR; INTRAVENOUS EVERY 6 HOURS PRN
Status: DISCONTINUED | OUTPATIENT
Start: 2025-06-26 | End: 2025-06-27 | Stop reason: HOSPADM

## 2025-06-26 RX ORDER — ACETAMINOPHEN 325 MG/1
650 TABLET ORAL EVERY 6 HOURS PRN
Status: DISCONTINUED | OUTPATIENT
Start: 2025-06-26 | End: 2025-06-27 | Stop reason: HOSPADM

## 2025-06-26 RX ADMIN — CEFAZOLIN SODIUM 2000 MG: 2 SOLUTION INTRAVENOUS at 10:04

## 2025-06-26 RX ADMIN — ACETAMINOPHEN 650 MG: 325 TABLET, FILM COATED ORAL at 16:23

## 2025-06-26 RX ADMIN — SODIUM CHLORIDE, SODIUM LACTATE, POTASSIUM CHLORIDE, AND CALCIUM CHLORIDE: .6; .31; .03; .02 INJECTION, SOLUTION INTRAVENOUS at 10:04

## 2025-06-26 RX ADMIN — PROPOFOL 100 MG: 10 INJECTION, EMULSION INTRAVENOUS at 10:08

## 2025-06-26 RX ADMIN — FENTANYL CITRATE 50 MCG: 50 INJECTION, SOLUTION INTRAMUSCULAR; INTRAVENOUS at 10:07

## 2025-06-26 RX ADMIN — LIDOCAINE HYDROCHLORIDE 50 MG: 10 INJECTION, SOLUTION EPIDURAL; INFILTRATION; INTRACAUDAL; PERINEURAL at 10:08

## 2025-06-26 RX ADMIN — LEVOFLOXACIN 500 MG: 500 INJECTION, SOLUTION INTRAVENOUS at 15:45

## 2025-06-26 RX ADMIN — DEXAMETHASONE SODIUM PHOSPHATE 10 MG: 10 INJECTION, SOLUTION INTRAMUSCULAR; INTRAVENOUS at 10:11

## 2025-06-26 RX ADMIN — DORZOLAMIDE HCL 1 DROP: 20 SOLUTION/ DROPS OPHTHALMIC at 22:57

## 2025-06-26 RX ADMIN — ONDANSETRON 4 MG: 2 INJECTION INTRAMUSCULAR; INTRAVENOUS at 10:11

## 2025-06-26 NOTE — PROGRESS NOTES
"Progress Note - Urology      Patient: Braeden Simpson   : 1940 Sex: male   MRN: [id number]     CSN: [id number]  Unit/Bed#: OR POOL     SUBJECTIVE:   Patient surgical unit no change history physical aware risk of anesthesia infection bleeding postop stenting      Objective   Vitals: /97   Pulse 66   Temp 97.9 °F (36.6 °C) (Skin)   Resp 18   Ht 5' 6\" (1.676 m)   Wt 55.5 kg (122 lb 5.7 oz)   SpO2 98%   BMI 19.75 kg/m²     No intake/output data recorded.      Physical Exam:   General Alert awake   Normocephalic atraumatic PERRLA  Lungs clear bilaterally  Cardiac normal S1 normal S2  Abdomen soft, flank pain  Extremities no edema      Lab Results: CBC:   Lab Results   Component Value Date    WBC 5.7 2017    HGB 12.4 (L) 2017    HCT 37.7 (L) 2017    MCV 92.0 2017     2017    RBC 4.10 (L) 2017    MCH 30.2 2017    MCHC 32.9 2017    RDW 13.5 2017    MPV 9.6 2017     CMP:   Lab Results   Component Value Date     2017     2017    CO2 30 2017    BUN 18 2017    CREATININE 0.98 2017    CALCIUM 9.8 2017    AST 42 (H) 2017    ALT 46 2017    ALKPHOS 66 2017    PROT 6.6 2017    BILITOT 0.6 2017     Urinalysis: No results found for: \"COLORU\", \"CLARITYU\", \"SPECGRAV\", \"PHUR\", \"LEUKOCYTESUR\", \"NITRITE\", \"PROTEINUA\", \"GLUCOSEU\", \"KETONESU\", \"BILIRUBINUR\", \"BLOODU\"  Urine Culture: No results found for: \"URINECX\"  PSA: No results found for: \"PSA\"      Assessment/ Plan:  Cystoscopy left flexible ureteroscopy lithotripsy stent          Dov Gallegos MD  "

## 2025-06-26 NOTE — PROGRESS NOTES
Patient alert and awake, dressing clean, dry and intact, vital signs WNL. Patient transferred to APU via stretcher Isadora. Bedside report given to SUSANA Baez. Stretcher in lowest position and locked, side rails up, call bell within reach.

## 2025-06-26 NOTE — PLAN OF CARE
Problem: PAIN - ADULT  Goal: Verbalizes/displays adequate comfort level or baseline comfort level  Description: Interventions:  - Encourage patient to monitor pain and request assistance  - Assess pain using appropriate pain scale  - Administer analgesics as ordered based on type and severity of pain and evaluate response  - Implement non-pharmacological measures as appropriate and evaluate response  - Consider cultural and social influences on pain and pain management  - Notify physician/advanced practitioner if interventions unsuccessful or patient reports new pain  - Educate patient/family on pain management process including their role and importance of  reporting pain   - Provide non-pharmacologic/complimentary pain relief interventions  Outcome: Progressing     Problem: INFECTION - ADULT  Goal: Absence or prevention of progression during hospitalization  Description: INTERVENTIONS:  - Assess and monitor for signs and symptoms of infection  - Monitor lab/diagnostic results  - Monitor all insertion sites, i.e. indwelling lines, tubes, and drains  - Monitor endotracheal if appropriate and nasal secretions for changes in amount and color  - Collins appropriate cooling/warming therapies per order  - Administer medications as ordered  - Instruct and encourage patient and family to use good hand hygiene technique  - Identify and instruct in appropriate isolation precautions for identified infection/condition  Outcome: Progressing  Goal: Absence of fever/infection during neutropenic period  Description: INTERVENTIONS:  - Monitor WBC  - Perform strict hand hygiene  - Limit to healthy visitors only  - No plants, dried, fresh or silk flowers with rucker in patient room  Outcome: Progressing     Problem: SAFETY ADULT  Goal: Patient will remain free of falls  Description: INTERVENTIONS:  - Educate patient/family on patient safety including physical limitations  - Instruct patient to call for assistance with activity   -  Consider consulting OT/PT to assist with strengthening/mobility based on AM PAC & JH-HLM score  - Consult OT/PT to assist with strengthening/mobility   - Keep Call bell within reach  - Keep bed low and locked with side rails adjusted as appropriate  - Keep care items and personal belongings within reach  - Initiate and maintain comfort rounds  - Make Fall Risk Sign visible to staff  - Offer Toileting every 2 Hours, in advance of need  - Initiate/Maintain BED alarm  - Apply yellow socks and bracelet for high fall risk patients  - Consider moving patient to room near nurses station  Outcome: Progressing  Goal: Maintain or return to baseline ADL function  Description: INTERVENTIONS:  -  Assess patient's ability to carry out ADLs; assess patient's baseline for ADL function and identify physical deficits which impact ability to perform ADLs (bathing, care of mouth/teeth, toileting, grooming, dressing, etc.)  - Assess/evaluate cause of self-care deficits   - Assess range of motion  - Assess patient's mobility; develop plan if impaired  - Assess patient's need for assistive devices and provide as appropriate  - Encourage maximum independence but intervene and supervise when necessary  - Involve family in performance of ADLs  - Assess for home care needs following discharge   - Consider OT consult to assist with ADL evaluation and planning for discharge  - Provide patient education as appropriate  - Monitor functional capacity and physical performance, use of AM PAC & JH-HLM   - Monitor gait, balance and fatigue with ambulation    Outcome: Progressing  Goal: Maintains/Returns to pre admission functional level  Description: INTERVENTIONS:  - Perform AM-PAC 6 Click Basic Mobility/ Daily Activity assessment daily.  - Set and communicate daily mobility goal to care team and patient/family/caregiver.   - Collaborate with rehabilitation services on mobility goals if consulted  - Perform Range of Motion 2 times a day.  -  Reposition patient every 2 hours.  - Dangle patient 2 times a day  - Stand patient 2 times a day  - Ambulate patient 2 times a day  - Out of bed to chair 2 times a day   - Out of bed for meals 2 times a day  - Out of bed for toileting  - Record patient progress and toleration of activity level   Outcome: Progressing     Problem: DISCHARGE PLANNING  Goal: Discharge to home or other facility with appropriate resources  Description: INTERVENTIONS:  - Identify barriers to discharge w/patient and caregiver  - Arrange for needed discharge resources and transportation as appropriate  - Identify discharge learning needs (meds, wound care, etc.)  - Arrange for interpretive services to assist at discharge as needed  - Refer to Case Management Department for coordinating discharge planning if the patient needs post-hospital services based on physician/advanced practitioner order or complex needs related to functional status, cognitive ability, or social support system  Outcome: Progressing     Problem: Knowledge Deficit  Goal: Patient/family/caregiver demonstrates understanding of disease process, treatment plan, medications, and discharge instructions  Description: Complete learning assessment and assess knowledge base.  Interventions:  - Provide teaching at level of understanding  - Provide teaching via preferred learning methods  Outcome: Progressing

## 2025-06-26 NOTE — CONSULTS
Consultation - Hospitalist   Name: Braeden Simpson 84 y.o. male I MRN: [id number]  Unit/Bed#: 3 [facility]-01 I Date of Admission: 6/26/2025   Date of Service: 6/26/2025 I Hospital Day: 0   Inpatient consult to Internal Medicine  Consult performed by: Salma Campos DO  Consult ordered by: Dov Gallegos MD      Physician Requesting Evaluation: Dov Gallegos MD   Reason for Evaluation / Principal Problem: Hypertension    Assessment & Plan  Essential hypertension  Continue Toprol-XL monitor blood pressures  Nephrolithiasis  Patient is status post cystoscopy, ureteroscopy with lithotripsy, bilateral retrograde pyelogram, basket extraction and left ureteral stent placement by urology today  Currently has Rhodes in place with hematuria  Check labs such as CBC, BMP today and again in a.m.  Atrial fibrillation (HCC)  Per review of outpatient cardiology note, patient with persistent A-fib  Holding Xarelto given hematuria.  Continue Toprol-XL  Last dose of Xarelto was 6/23  Sleep apnea  Patient can utilize on CPAP machine nightly        VTE Pharmacologic Prophylaxis:   None due to gross hematuria  Discussion with family: Updated  (wife and daughter) at bedside.      History of Present Illness   Chief Complaint: Nephrolithiasis    Braeden Simpson is a 84 y.o. male with a PMH of A-fib, hypertension who is admitted to urology service status post cystoscopy, ureteroscopy with laser lithotripsy and left ureteral stent placement due to nephrolithiasis.  We are consulted for medical management.  Patient currently with Rhodes in place with hematuria.  Patient reports some abdominal pain earlier which has resolved after he had a bowel movement today.  Denies any chest pain, shortness of breath, changes in appetite.  Reports some rhinorrhea previously which has since resolved    Review of Systems   Constitutional:  Negative for appetite change, chills and fever.   HENT:  Negative for congestion and trouble swallowing.     Eyes:  Negative for visual disturbance.   Respiratory:  Negative for chest tightness and shortness of breath.    Cardiovascular:  Negative for chest pain and leg swelling.   Gastrointestinal:  Positive for abdominal pain. Negative for diarrhea, nausea and vomiting.   Genitourinary:  Negative for dysuria and frequency.   Musculoskeletal:  Positive for back pain.   Skin:  Negative for wound.   Neurological:  Negative for headaches.   Hematological:  Does not bruise/bleed easily.   Psychiatric/Behavioral:  Negative for agitation.        Historical Information   Past Medical History[1]  Past Surgical History[2]  Social History[3]  E-Cigarette/Vaping    E-Cigarette Use Never User      E-Cigarette/Vaping Substances    Nicotine No     THC No     CBD No     Flavoring No     Other No     Unknown No      Family History[4]  Social History:  Marital Status: /Civil Union   Occupation: none  Patient Pre-hospital Living Situation: With spouse  Patient Pre-hospital Level of Mobility: walks with cane  Patient Pre-hospital Diet Restrictions: none    Meds/Allergies   I have reviewed home medications using recent Epic encounter.  Prior to Admission medications    Medication Sig Start Date End Date Taking? Authorizing Provider   cholecalciferol (VITAMIN D3) 1,000 units tablet Take 1,000 Units by mouth in the morning.   Yes Historical Provider, MD   dorzolamide (TRUSOPT) 2 % ophthalmic solution INSTILL 1 DROP INTO AFFECTED EYE(S) BY OPHTHALMIC ROUTE 3 TIMES PER DAY   Yes Historical Provider, MD   metoprolol succinate (TOPROL-XL) 25 mg 24 hr tablet Take 1 tablet by mouth daily 3/28/25  Yes Historical Provider, MD   Misc Natural Products (LUTEIN 20 PO) Take by mouth   Yes Historical Provider, MD   rivaroxaban (Xarelto) 15 mg tablet Take 15 mg by mouth 2/19/25  Yes Historical Provider, MD   vitamin B-12 (CYANOCOBALAMIN) 50 MCG tablet Take by mouth daily   Yes Historical Provider, MD   bimatoprost (LUMIGAN) 0.01 % ophthalmic drops 1  drop daily at bedtime  6/26/25 Yes Historical Provider, MD   magnesium 30 MG tablet Take 30 mg by mouth 2 (two) times a day  6/26/25 Yes Historical Provider, MD   multivitamin (THERAGRAN) TABS Take 1 tablet by mouth daily  6/26/25 Yes Historical Provider, MD   benzoyl peroxide 10 % gel Apply topically daily    Historical Provider, MD   sildenafil (VIAGRA) 50 MG tablet Take 1 tablet (50 mg total) by mouth daily as needed for erectile dysfunction 6/5/25   Arvind Yuen MD   amLODIPine-atorvastatin (CADUET) 5-10 MG per tablet Take 1 tablet by mouth daily  Patient not taking: Reported on 6/5/2025 6/26/25  Historical Provider, MD   aspirin (ECOTRIN LOW STRENGTH) 81 mg EC tablet Take 81 mg by mouth daily  Patient not taking: Reported on 6/5/2025 6/26/25  Historical Provider, MD   timolol (BETIMOL) 0.5 % ophthalmic solution 1 drop in the morning and 1 drop in the evening.  6/26/25  Historical Provider, MD     Allergies   Allergen Reactions    Apixaban Other (See Comments)       Objective :  Temp:  [97 °F (36.1 °C)-97.9 °F (36.6 °C)] 97.6 °F (36.4 °C)  HR:  [56-96] 71  BP: (126-181)/() 170/82  Resp:  [12-19] 17  SpO2:  [95 %-98 %] 98 %  O2 Device: None (Room air)    Physical Exam  Vitals reviewed.   Constitutional:       General: He is not in acute distress.     Appearance: He is not toxic-appearing.   HENT:      Head: Normocephalic and atraumatic.     Eyes:      Conjunctiva/sclera: Conjunctivae normal.       Cardiovascular:      Rate and Rhythm: Normal rate. Rhythm irregular.   Pulmonary:      Effort: Pulmonary effort is normal. No respiratory distress.      Breath sounds: Normal breath sounds. No wheezing or rales.   Abdominal:      General: There is no distension.      Palpations: Abdomen is soft.      Tenderness: There is no abdominal tenderness.   Genitourinary:     Comments: Rhodes in place with hematuria in bag    Neurological:      Mental Status: He is alert.     Psychiatric:         Mood and Affect: Mood  "normal.         Lines/Drains:  Lines/Drains/Airways       Active Status       Name Placement date Placement time Site Days    Urethral Catheter Latex 18 Fr. 25  --  Latex  less than 1                  Urinary Catheter:  Goal for removal: Defer to primary team               Lab Results: I have reviewed the following results:                  No results found for: \"HGBA1C\"        Imaging Results Review: No pertinent imaging studies reviewed.  Other Study Results Review: Other studies reviewed include: Operative report    Administrative Statements       ** Please Note: This note has been constructed using a voice recognition system. 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     with radiation and chemo-  in [place]   [2]   Past Surgical History:  Procedure Laterality Date    CHOLECYSTECTOMY      COLONOSCOPY      EYE SURGERY      MOHS SURGERY      MOHS SURGERY  10/27/2020    right supra sal tip   [3]   Social History  Tobacco Use    Smoking status: Former     Current packs/day: 0.00     Types: Cigarettes     Quit date: 1968     Years since quittin.8    Smokeless tobacco: Never   Vaping Use    Vaping status: Never Used   Substance and Sexual Activity    Alcohol use: Yes     Comment: social    Drug use: Never   [4]   Family History  Problem Relation Name Age of Onset    Prostate cancer Father       "

## 2025-06-26 NOTE — ANESTHESIA POSTPROCEDURE EVALUATION
Post-Op Assessment Note    CV Status:  Stable  Pain Score: 0    Pain management: adequate       Mental Status:  Alert and awake   Hydration Status:  Euvolemic   PONV Controlled:  Controlled   Airway Patency:  Patent     Post Op Vitals Reviewed: Yes    No anethesia notable event occurred.    Staff: Anesthesiologist, CRNA           Last Filed PACU Vitals:  Vitals Value Taken Time   Temp 97 °F (36.1 °C) 06/26/25 11:31   Pulse 88 06/26/25 11:31   /88 06/26/25 11:31   Resp 12 06/26/25 11:31   SpO2 96 % 06/26/25 11:31

## 2025-06-26 NOTE — ANESTHESIA POSTPROCEDURE EVALUATION
Post-Op Assessment Note    CV Status:  Stable  Pain Score: 0    Pain management: adequate       Mental Status:  Awake   Hydration Status:  Stable   PONV Controlled:  None   Airway Patency:  Patent     Post Op Vitals Reviewed: Yes    No anethesia notable event occurred.    Staff: Anesthesiologist           Last Filed PACU Vitals:  Vitals Value Taken Time   Temp 97 °F (36.1 °C) 06/26/25 11:31   Pulse 72 06/26/25 13:33   /86 06/26/25 13:33   Resp 16 06/26/25 13:33   SpO2 98 % 06/26/25 13:33       Modified Edouard:     Vitals Value Taken Time   Activity 2 06/26/25 11:30   Respiration 2 06/26/25 11:30   Circulation 2 06/26/25 11:30   Consciousness 1 06/26/25 11:30   Oxygen Saturation 2 06/26/25 11:30     Modified Edouard Score: 9

## 2025-06-26 NOTE — OP NOTE
OPERATIVE REPORT  PATIENT NAME: Braeden Simpson    :  1940  MRN: [id number]  Pt Location: [facility] OR ROOM 04    SURGERY DATE: 2025    Surgeons and Role:     * Dov Gallegos MD - Primary    Preop Diagnosis:  Calculus of kidney [N20.0]    Post-Op Diagnosis Codes:     * Calculus of kidney [N20.0]    Procedure(s):  Left - CYSTOSCOPY URETEROSCOPY WITH LITHOTRIPSY HOLMIUM LASER. BILATERALRETROGRADE PYELOGRAM. BASKET EXTRACTION. AND INSERTION STENT URETERAL    Specimen(s):  ID Type Source Tests Collected by Time Destination   1 :  Calculus Kidney, Left STONE ANALYSIS, TISSUE EXAM Dov Gallegos MD 2025 1031        Estimated Blood Loss:   Minimal    Drains:  Urethral Catheter Latex 18 Fr. (Active)   Number of days: 0       Anesthesia Type:   General    Operative Indications:  Calculus of kidney [N20.0]  84-year-old male presenting to the office found to have multiple left renal stones residing in [place] during the winter months up for the summer wishes ureteroscopy stone extraction aware risk of anesthesia infection bleeding additional urologic procedures postop stenting    Operative Findings:  Significant bladder neck contracture  Status post palladium seedings  Dilated opened  15 mm mm stones lower pole stone  10 mm renal pole stone requiring aggressive laser lithotripsy attempts at removing fragments and lower pole stone difficult due to anterior calyx  24 cm 6 [place] stent passed  Stent to be removed in a few weeks to pass stone burden  20 Kazakh Rhodes passed moderate hematuria noted postop       Complications:   None    Procedure and Technique:  Patient identified in the holding area left side marked consent reviewed wished to proceed with procedure placed in the op suite after anesthesia was induced placed in lithotomy position draped and prepped in standard fashion timeout performed fluoroscopic views confirmed palladium seedings 22 Kazakh cystoscope with 30 degree lens passed through the anterior  urethra to the posterior urethra once entering the posterior urethra there was significant bladder neck contracture which was passively dilated with the scope to get into the bladder no bladder abnormalities were noted 0.038 wire passed up into the left renal pelvis a second 0.038 wire passed up into the pelvis the first of the safety the scope removed and the 45 cm ureteral sheath was passed easily up into the left renal pelvis retrograde pyelogram confirmed the sheath to be in the pelvis the obturator was removed the flexible ureteroscope was passed there were 2 large stones 10 mm in the middle pole calyx as well as a 15 mm in the anterior lower pole calyx middle pole was addressed first broken into multiple fragments some removed with the basket the lower pole stone could not be manipulated out with the different basket is used laser lithotripsy in situ in the lower pole with significant fragmentation noted multiple attempts at reentering the calyx with the different baskets to remove fragments was performed stone burden noted in the lower pole calyx was less than 3 to 2 mm once adequate fragmentation was noted.  Basket extraction multiple fragments sent for analysis the scope was removed the sheath was removed and over the safety wire 24 cm 6 Greek stent was passed 20 Greek Rhodes inserted and left the bag drainage time of dictation postop hematuria noted patient had procedure awakened taken to recovery room in stable condition   I was present for the entire procedure.    Patient Disposition:  PACU          SIGNATURE: Dov Gallegos MD  DATE: June 26, 2025  TIME: 11:26 AM

## 2025-06-27 VITALS
DIASTOLIC BLOOD PRESSURE: 80 MMHG | RESPIRATION RATE: 18 BRPM | HEIGHT: 66 IN | TEMPERATURE: 98 F | HEART RATE: 71 BPM | OXYGEN SATURATION: 98 % | SYSTOLIC BLOOD PRESSURE: 147 MMHG | WEIGHT: 122.36 LBS | BODY MASS INDEX: 19.66 KG/M2

## 2025-06-27 PROBLEM — R31.9 HEMATURIA: Status: ACTIVE | Noted: 2025-06-27

## 2025-06-27 PROBLEM — R31.9 HEMATURIA: Status: RESOLVED | Noted: 2025-06-27 | Resolved: 2025-06-27

## 2025-06-27 LAB
ERYTHROCYTE [DISTWIDTH] IN BLOOD BY AUTOMATED COUNT: 14 % (ref 11.6–15.1)
HCT VFR BLD AUTO: 37.9 % (ref 36.5–49.3)
HGB BLD-MCNC: 12.4 G/DL (ref 12–17)
MCH RBC QN AUTO: 30.4 PG (ref 26.8–34.3)
MCHC RBC AUTO-ENTMCNC: 32.7 G/DL (ref 31.4–37.4)
MCV RBC AUTO: 93 FL (ref 82–98)
PLATELET # BLD AUTO: 194 THOUSANDS/UL (ref 149–390)
PMV BLD AUTO: 9.1 FL (ref 8.9–12.7)
RBC # BLD AUTO: 4.08 MILLION/UL (ref 3.88–5.62)
WBC # BLD AUTO: 11.2 THOUSAND/UL (ref 4.31–10.16)

## 2025-06-27 PROCEDURE — 85027 COMPLETE CBC AUTOMATED: CPT | Performed by: FAMILY MEDICINE

## 2025-06-27 PROCEDURE — 99232 SBSQ HOSP IP/OBS MODERATE 35: CPT | Performed by: FAMILY MEDICINE

## 2025-06-27 RX ADMIN — Medication 1000 UNITS: at 08:31

## 2025-06-27 RX ADMIN — METOPROLOL SUCCINATE 25 MG: 25 TABLET, EXTENDED RELEASE ORAL at 08:32

## 2025-06-27 RX ADMIN — DORZOLAMIDE HCL 1 DROP: 20 SOLUTION/ DROPS OPHTHALMIC at 08:32

## 2025-06-27 NOTE — PLAN OF CARE
Problem: PAIN - ADULT  Goal: Verbalizes/displays adequate comfort level or baseline comfort level  Description: Interventions:  - Encourage patient to monitor pain and request assistance  - Assess pain using appropriate pain scale  - Administer analgesics as ordered based on type and severity of pain and evaluate response  - Implement non-pharmacological measures as appropriate and evaluate response  - Consider cultural and social influences on pain and pain management  - Notify physician/advanced practitioner if interventions unsuccessful or patient reports new pain  - Educate patient/family on pain management process including their role and importance of  reporting pain   - Provide non-pharmacologic/complimentary pain relief interventions  6/27/2025 0929 by Olga Valerio RN  Outcome: Progressing  6/27/2025 0928 by Olga Valerio RN  Outcome: Progressing     Problem: INFECTION - ADULT  Goal: Absence or prevention of progression during hospitalization  Description: INTERVENTIONS:  - Assess and monitor for signs and symptoms of infection  - Monitor lab/diagnostic results  - Monitor all insertion sites, i.e. indwelling lines, tubes, and drains  - Monitor endotracheal if appropriate and nasal secretions for changes in amount and color  - [place] appropriate cooling/warming therapies per order  - Administer medications as ordered  - Instruct and encourage patient and family to use good hand hygiene technique  - Identify and instruct in appropriate isolation precautions for identified infection/condition  6/27/2025 0929 by Olga Valerio RN  Outcome: Progressing  6/27/2025 0928 by Olga Valerio RN  Outcome: Progressing  Goal: Absence of fever/infection during neutropenic period  Description: INTERVENTIONS:  - Monitor WBC  - Perform strict hand hygiene  - Limit to healthy visitors only  - No plants, dried, fresh or silk flowers with rucker in patient room  6/27/2025 0929 by Olga Valerio RN  Outcome:  Progressing  6/27/2025 0928 by Olga Valerio RN  Outcome: Progressing     Problem: SAFETY ADULT  Goal: Patient will remain free of falls  Description: INTERVENTIONS:  - Educate patient/family on patient safety including physical limitations  - Instruct patient to call for assistance with activity   - Consider consulting OT/PT to assist with strengthening/mobility based on AM PAC & JH-HLM score  - Consult OT/PT to assist with strengthening/mobility   - Keep Call bell within reach  - Keep bed low and locked with side rails adjusted as appropriate  - Keep care items and personal belongings within reach  - Initiate and maintain comfort rounds  - Make Fall Risk Sign visible to staff  - Offer Toileting every 2 Hours, in advance of need  - Initiate/Maintain bed alarm  - Obtain necessary fall risk management equipment: alarm  - Apply yellow socks and bracelet for high fall risk patients  - Consider moving patient to room near nurses station  6/27/2025 0929 by Olga Valerio RN  Outcome: Progressing  6/27/2025 0928 by Olga Valerio RN  Outcome: Progressing     Problem: DISCHARGE PLANNING  Goal: Discharge to home or other facility with appropriate resources  Description: INTERVENTIONS:  - Identify barriers to discharge w/patient and caregiver  - Arrange for needed discharge resources and transportation as appropriate  - Identify discharge learning needs (meds, wound care, etc.)  - Arrange for interpretive services to assist at discharge as needed  - Refer to Case Management Department for coordinating discharge planning if the patient needs post-hospital services based on physician/advanced practitioner order or complex needs related to functional status, cognitive ability, or social support system  6/27/2025 0929 by Olga Valerio RN  Outcome: Progressing  6/27/2025 0928 by Olga Valerio RN  Outcome: Progressing     Problem: Knowledge Deficit  Goal: Patient/family/caregiver demonstrates understanding of disease  process, treatment plan, medications, and discharge instructions  Description: Complete learning assessment and assess knowledge base.  Interventions:  - Provide teaching at level of understanding  - Provide teaching via preferred learning methods  6/27/2025 0929 by Olga Valerio RN  Outcome: Progressing  6/27/2025 0928 by Olga Valerio RN  Outcome: Progressing

## 2025-06-27 NOTE — DISCHARGE INSTR - AVS FIRST PAGE
#1 no heavy straining or lifting above 10 pounds for 2 weeks    #2 call office fevers, chills, or worsening blood in the urine.    #3  Patient to increase fluids  Present to office on Monday 930 to remove Rhodes if urine clear      Dov Gallegos M.D. [facility] office  [address] [place] [address].  [place] [address]  [phone]  8:30 AM to 4:30 PM  Monday through Friday    [facility] office  [address]  [address]  [place] [address]  [phone]  1:00 to 5:00 PM  Wednesday     Hold Xarelto until cleared by urology to restart.  Discuss with Dr. Gallegos Monday to see if it is safe to restart the Xarelto

## 2025-06-27 NOTE — PLAN OF CARE
Problem: PAIN - ADULT  Goal: Verbalizes/displays adequate comfort level or baseline comfort level  Description: Interventions:  - Encourage patient to monitor pain and request assistance  - Assess pain using appropriate pain scale  - Administer analgesics as ordered based on type and severity of pain and evaluate response  - Implement non-pharmacological measures as appropriate and evaluate response  - Consider cultural and social influences on pain and pain management  - Notify physician/advanced practitioner if interventions unsuccessful or patient reports new pain  - Educate patient/family on pain management process including their role and importance of  reporting pain   - Provide non-pharmacologic/complimentary pain relief interventions  Outcome: Progressing     Problem: INFECTION - ADULT  Goal: Absence or prevention of progression during hospitalization  Description: INTERVENTIONS:  - Assess and monitor for signs and symptoms of infection  - Monitor lab/diagnostic results  - Monitor all insertion sites, i.e. indwelling lines, tubes, and drains  - Monitor endotracheal if appropriate and nasal secretions for changes in amount and color  - [place] appropriate cooling/warming therapies per order  - Administer medications as ordered  - Instruct and encourage patient and family to use good hand hygiene technique  - Identify and instruct in appropriate isolation precautions for identified infection/condition  Outcome: Progressing  Goal: Absence of fever/infection during neutropenic period  Description: INTERVENTIONS:  - Monitor WBC  - Perform strict hand hygiene  - Limit to healthy visitors only  - No plants, dried, fresh or silk flowers with rucker in patient room  Outcome: Progressing     Problem: SAFETY ADULT  Goal: Patient will remain free of falls  Description: INTERVENTIONS:  - Educate patient/family on patient safety including physical limitations  - Instruct patient to call for assistance with activity   -  Consider consulting OT/PT to assist with strengthening/mobility based on AM PAC & JH-HLM score  - Consult OT/PT to assist with strengthening/mobility   - Keep Call bell within reach  - Keep bed low and locked with side rails adjusted as appropriate  - Keep care items and personal belongings within reach  - Initiate and maintain comfort rounds  - Make Fall Risk Sign visible to staff  - Offer Toileting every 4 Hours, in advance of need  - Initiate/Maintain bed/chair alarm  - Apply yellow socks and bracelet for high fall risk patients  - Consider moving patient to room near nurses station  Outcome: Progressing  Goal: Maintain or return to baseline ADL function  Description: INTERVENTIONS:  -  Assess patient's ability to carry out ADLs; assess patient's baseline for ADL function and identify physical deficits which impact ability to perform ADLs (bathing, care of mouth/teeth, toileting, grooming, dressing, etc.)  - Assess/evaluate cause of self-care deficits   - Assess range of motion  - Assess patient's mobility; develop plan if impaired  - Assess patient's need for assistive devices and provide as appropriate  - Encourage maximum independence but intervene and supervise when necessary  - Involve family in performance of ADLs  - Assess for home care needs following discharge   - Consider OT consult to assist with ADL evaluation and planning for discharge  - Provide patient education as appropriate  - Monitor functional capacity and physical performance, use of AM PAC & JH-HLM   - Monitor gait, balance and fatigue with ambulation    Outcome: Progressing  Goal: Maintains/Returns to pre admission functional level  Description: INTERVENTIONS:  - Perform AM-PAC 6 Click Basic Mobility/ Daily Activity assessment daily.  - Set and communicate daily mobility goal to care team and patient/family/caregiver.   - Collaborate with rehabilitation services on mobility goals if consulted  - Perform Range of Motion 3 times a day.  -  Reposition patient every 2 hours.  - Dangle patient 3 times a day  - Stand patient 3 times a day  - Ambulate patient 1 times a day  - Out of bed to chair 3 times a day   - Out of bed for meals 3  Problem: DISCHARGE PLANNING  Goal: Discharge to home or other facility with appropriate resources  Description: INTERVENTIONS:  - Identify barriers to discharge w/patient and caregiver  - Arrange for needed discharge resources and transportation as appropriate  - Identify discharge learning needs (meds, wound care, etc.)  - Arrange for interpretive services to assist at discharge as needed  - Refer to Case Management Department for coordinating discharge planning if the patient needs post-hospital services based on physician/advanced practitioner order or complex needs related to functional status, cognitive ability, or social support system  Outcome: Progressing     Problem: Knowledge Deficit  Goal: Patient/family/caregiver demonstrates understanding of disease process, treatment plan, medications, and discharge instructions  Description: Complete learning assessment and assess knowledge base.  Interventions:  - Provide teaching at level of understanding  - Provide teaching via preferred learning methods  Outcome: Progressing    times a day  - Out of bed for toileting  - Record patient progress and toleration of activity level   Outcome: Progressing

## 2025-06-27 NOTE — PROGRESS NOTES
Progress Note - Hospitalist   Name: Braeden Simpson 84 y.o. male I MRN: [id number]  Unit/Bed#: [address] [address] A  Date of Admission: 6/26/2025   Date of Service: 6/27/2025 I Hospital Day: 0    Assessment & Plan  Essential hypertension  Continue Toprol-XL, BPs stable. monitor blood pressures  Nephrolithiasis  Patient is status post cystoscopy, ureteroscopy with lithotripsy, bilateral retrograde pyelogram, basket extraction and left ureteral stent placement by urology on 6/26  Currently has Rhodes in place with hematuria  Hemoglobin stable on lab work  Atrial fibrillation (HCC)  Per review of outpatient cardiology note, patient with persistent A-fib  Holding Xarelto given hematuria.  Per urology likely restart on outpatient basis if hematuria resolves on follow-up. continue Toprol-XL  Last dose of Xarelto was 6/23  Sleep apnea  Continue CPAP machine nightly    VTE Pharmacologic Prophylaxis:   none     Mobility:   Basic Mobility Inpatient Raw Score: 24  JH-HLM Goal: 8: Walk 250 feet or more  JH-HLM Achieved: 7: Walk 25 feet or more  JH-HLM Goal NOT achieved. Continue with multidisciplinary rounding and encourage appropriate mobility to improve upon JH-HLM goals.    Patient Centered Rounds: I performed bedside rounds with nursing staff today.   Discussions with Specialists or Other Care Team Provider: yes - urology    Education and Discussions with Family / Patient: Updated  (wife) at bedside.    Current Length of Stay: 0 day(s)  Current Patient Status: Outpatient Surgery   Discharge Plan: [facility] is following this patient on consult. They are medically stable for discharge when deemed appropriate by primary service.    Code Status: No Order    Subjective   Patient denies any pain.  Denies shortness of breath    Objective :  Temp:  [97 °F (36.1 °C)-98.1 °F (36.7 °C)] 98 °F (36.7 °C)  HR:  [56-96] 71  BP: (115-181)/() 147/80  Resp:  [12-20] 18  SpO2:  [95 %-99 %] 98 %  O2 Device: None (Room air)    Body mass  index is 19.75 kg/m².     Input and Output Summary (last 24 hours):     Intake/Output Summary (Last 24 hours) at 6/27/2025 1013  Last data filed at 6/27/2025 0833  Gross per 24 hour   Intake 1100 ml   Output 1950 ml   Net -850 ml       Physical Exam  Vitals reviewed.   Constitutional:       General: He is not in acute distress.     Appearance: He is not toxic-appearing.   HENT:      Head: Normocephalic and atraumatic.     Eyes:      General:         Right eye: No discharge.         Left eye: No discharge.      Extraocular Movements: Extraocular movements intact.       Cardiovascular:      Rate and Rhythm: Normal rate and regular rhythm.   Pulmonary:      Effort: Pulmonary effort is normal. No respiratory distress.      Breath sounds: Normal breath sounds. No wheezing or rales.   Abdominal:      General: Bowel sounds are normal. There is no distension.      Palpations: Abdomen is soft.      Tenderness: There is no abdominal tenderness.   Genitourinary:     Comments: Rhodes in place and urine urine today appears with less hematuria    Neurological:      Mental Status: He is alert.         Lines/Drains:  Lines/Drains/Airways       Active Status       Name Placement date Placement time Site Days    Urethral Catheter Latex 18 Fr. 06/26/25  --  Latex  1                  Urinary Catheter:  Goal for removal: N/A- Discharging with Rhodes                 Lab Results: I have reviewed the following results:   Results from last 7 days   Lab Units 06/27/25  0456   WBC Thousand/uL 11.20*   HEMOGLOBIN g/dL 12.4   HEMATOCRIT % 37.9   PLATELETS Thousands/uL 194     Results from last 7 days   Lab Units 06/26/25  1721   SODIUM mmol/L 138   POTASSIUM mmol/L 4.6   CHLORIDE mmol/L 105   CO2 mmol/L 22   BUN mg/dL 27*   CREATININE mg/dL 0.87   ANION GAP mmol/L 11   CALCIUM mg/dL 10.1   GLUCOSE RANDOM mg/dL 166*                       Recent Cultures (last 7 days):         Imaging Results Review: I reviewed radiology reports from this admission  including: procedure reports.  Other Study Results Review: No additional pertinent studies reviewed.    Last 24 Hours Medication List:     Current Facility-Administered Medications:     acetaminophen (TYLENOL) tablet 650 mg, Q6H PRN    aluminum-magnesium hydroxide-simethicone (MAALOX) oral suspension 30 mL, Q6H PRN    benzoyl peroxide 10 % topical gel, Daily    Cholecalciferol (VITAMIN D3) tablet 1,000 Units, Daily    dorzolamide (TRUSOPT) ophthalmic solution 1 drop, TID    hydrALAZINE (APRESOLINE) injection 10 mg, Once    lactated ringers bolus 1,000 mL, Once PRN **AND** lactated ringers bolus 1,000 mL, Once PRN    lactated ringers infusion, Continuous    metoprolol succinate (TOPROL-XL) 24 hr tablet 25 mg, Daily    morphine injection 2 mg, Q4H PRN    ondansetron (ZOFRAN) injection 4 mg, Q6H PRN    sodium chloride 0.9 % bolus 1,000 mL, Once PRN **AND** sodium chloride 0.9 % bolus 1,000 mL, Once PRN    Administrative Statements   Today, Patient Was Seen By: Salma Campos DO    **Please Note: This note may have been constructed using a voice recognition system.**

## 2025-06-27 NOTE — ASSESSMENT & PLAN NOTE
Patient is status post cystoscopy, ureteroscopy with lithotripsy, bilateral retrograde pyelogram, basket extraction and left ureteral stent placement by urology today  Currently has Rhodes in place with hematuria  Check labs such as CBC, BMP today and again in a.m.

## 2025-06-27 NOTE — ASSESSMENT & PLAN NOTE
Patient is status post cystoscopy, ureteroscopy with lithotripsy, bilateral retrograde pyelogram, basket extraction and left ureteral stent placement by urology on 6/26  Currently has Rhodes in place with hematuria  Hemoglobin stable on lab work

## 2025-06-27 NOTE — ASSESSMENT & PLAN NOTE
Per review of outpatient cardiology note, patient with persistent A-fib  Holding Xarelto given hematuria.  Per urology likely restart on outpatient basis if hematuria resolves on follow-up. continue Toprol-XL  Last dose of Xarelto was 6/23

## 2025-06-27 NOTE — PROGRESS NOTES
"Progress Note - Urology      Patient: Braeden Simpson   : 1940 Sex: male   MRN: [id number]     CSN: [id number]  Unit/Bed#: [address] [place] [address]     SUBJECTIVE:   Patient seen on morning rounds  Rhodes draining minimal hematuric urine irrigated by myself no clots most likely dehydration causing it to look hematuric  Patient wishes to go home with Rhodes  We will remove it on Monday  Stent to be removed in 2 weeks      Objective   Vitals: /80   Pulse 71   Temp 98 °F (36.7 °C)   Resp 18   Ht 5' 6\" (1.676 m)   Wt 55.5 kg (122 lb 5.7 oz)   SpO2 98%   BMI 19.75 kg/m²     I/O last 24 hours:  In: 800 [I.V.:800]  Out: 1950 [Urine:1950]      Physical Exam:   General Alert awake   Normocephalic atraumatic PERRLA  Lungs clear bilaterally  Cardiac normal S1 normal S2  Abdomen soft, flank pain  Extremities no edema      Lab Results: CBC:   Lab Results   Component Value Date    WBC 11.20 (H) 2025    HGB 12.4 2025    HCT 37.9 2025    MCV 93 2025     2025    RBC 4.08 2025    MCH 30.4 2025    MCHC 32.7 2025    RDW 14.0 2025    MPV 9.1 2025     CMP:   Lab Results   Component Value Date     2017     2025     2017    CO2 22 2025    CO2 30 2017    BUN 27 (H) 2025    BUN 18 2017    CREATININE 0.87 2025    CREATININE 0.98 2017    CALCIUM 10.1 2025    CALCIUM 9.8 2017    AST 42 (H) 2017    ALT 46 2017    ALKPHOS 66 2017    PROT 6.6 2017    BILITOT 0.6 2017    EGFR 78 2025     Urinalysis: No results found for: \"COLORU\", \"CLARITYU\", \"SPECGRAV\", \"PHUR\", \"LEUKOCYTESUR\", \"NITRITE\", \"PROTEINUA\", \"GLUCOSEU\", \"KETONESU\", \"BILIRUBINUR\", \"BLOODU\"  Urine Culture: No results found for: \"URINECX\"  PSA: No results found for: \"PSA\"      Assessment/ Plan:  Status post cystoscopy left ureteroscopy laser day #1  Rhodes catheter draining minimal hematuric " urine  Patient wishes to go home we will leave Rhodes catheter in and remove it Monday morning          Dov Gallegos MD

## 2025-06-27 NOTE — ASSESSMENT & PLAN NOTE
Per review of outpatient cardiology note, patient with persistent A-fib  Holding Xarelto given hematuria.  Continue Toprol-XL  Last dose of Xarelto was 6/23

## 2025-07-03 ENCOUNTER — APPOINTMENT (OUTPATIENT)
Dept: RADIOLOGY | Facility: MEDICAL CENTER | Age: 85
End: 2025-07-03
Payer: COMMERCIAL

## 2025-07-03 ENCOUNTER — OFFICE VISIT (OUTPATIENT)
Dept: DERMATOLOGY | Facility: CLINIC | Age: 85
End: 2025-07-03

## 2025-07-03 VITALS — BODY MASS INDEX: 19.75 KG/M2 | HEIGHT: 66 IN | TEMPERATURE: 98.1 F

## 2025-07-03 DIAGNOSIS — D22.5 MULTIPLE BENIGN MELANOCYTIC NEVI OF UPPER AND LOWER EXTREMITIES AND TRUNK: ICD-10-CM

## 2025-07-03 DIAGNOSIS — Z86.007 HISTORY OF SQUAMOUS CELL CARCINOMA IN SITU (SCCIS) OF SKIN: ICD-10-CM

## 2025-07-03 DIAGNOSIS — D18.01 CHERRY ANGIOMA: ICD-10-CM

## 2025-07-03 DIAGNOSIS — D22.71 MULTIPLE BENIGN MELANOCYTIC NEVI OF UPPER AND LOWER EXTREMITIES AND TRUNK: ICD-10-CM

## 2025-07-03 DIAGNOSIS — L81.4 LENTIGINES: ICD-10-CM

## 2025-07-03 DIAGNOSIS — D22.62 MULTIPLE BENIGN MELANOCYTIC NEVI OF UPPER AND LOWER EXTREMITIES AND TRUNK: ICD-10-CM

## 2025-07-03 DIAGNOSIS — L82.1 SK (SEBORRHEIC KERATOSIS): ICD-10-CM

## 2025-07-03 DIAGNOSIS — D48.5 NEOPLASM OF UNCERTAIN BEHAVIOR OF SKIN: ICD-10-CM

## 2025-07-03 DIAGNOSIS — D22.72 MULTIPLE BENIGN MELANOCYTIC NEVI OF UPPER AND LOWER EXTREMITIES AND TRUNK: ICD-10-CM

## 2025-07-03 DIAGNOSIS — N20.0 RENAL CALCULUS: ICD-10-CM

## 2025-07-03 DIAGNOSIS — Z12.83 SKIN CANCER SCREENING: Primary | ICD-10-CM

## 2025-07-03 DIAGNOSIS — D22.61 MULTIPLE BENIGN MELANOCYTIC NEVI OF UPPER AND LOWER EXTREMITIES AND TRUNK: ICD-10-CM

## 2025-07-03 DIAGNOSIS — L74.0 HEAT RASH: ICD-10-CM

## 2025-07-03 DIAGNOSIS — Z85.828 HISTORY OF BASAL CELL CARCINOMA (BCC): ICD-10-CM

## 2025-07-03 DIAGNOSIS — L85.3 XEROSIS OF SKIN: ICD-10-CM

## 2025-07-03 PROCEDURE — 74018 RADEX ABDOMEN 1 VIEW: CPT

## 2025-07-03 PROCEDURE — 88342 IMHCHEM/IMCYTCHM 1ST ANTB: CPT | Performed by: STUDENT IN AN ORGANIZED HEALTH CARE EDUCATION/TRAINING PROGRAM

## 2025-07-03 PROCEDURE — 88341 IMHCHEM/IMCYTCHM EA ADD ANTB: CPT | Performed by: STUDENT IN AN ORGANIZED HEALTH CARE EDUCATION/TRAINING PROGRAM

## 2025-07-03 PROCEDURE — 88305 TISSUE EXAM BY PATHOLOGIST: CPT | Performed by: STUDENT IN AN ORGANIZED HEALTH CARE EDUCATION/TRAINING PROGRAM

## 2025-07-03 NOTE — PROGRESS NOTES
"[facility] Dermatology Clinic Note     Patient Name: Braeden Simpson  Encounter Date: 07/03/2025       Have you been cared for by a [facility] Dermatologist in the last 3 years and, if so, which description applies to you? NO. I am considered a \"new\" patient and must complete all patient intake questions. I am not of child-bearing potential.     REVIEW OF SYSTEMS:  Have you recently had or currently have any of the following? Recent fever or chills? No  Any non-healing wound? No   PAST MEDICAL HISTORY:  Have you personally ever had or currently have any of the following?  If \"YES,\" then please provide more detail. Skin cancer (such as Melanoma, Basal Cell Carcinoma, Squamous Cell Carcinoma?  YES, BCC and SCCIS  Tuberculosis, HIV/AIDS, Hepatitis B or C: No  Radiation Treatment YES, starting in Feb 2024   HISTORY OF IMMUNOSUPPRESSION:   Do you have a history of any of the following:  Systemic Immunosuppression such as Diabetes, Biologic or Immunotherapy, Chemotherapy, Organ Transplantation, Bone Marrow Transplantation or Prednisone?  YES, Chemo in Feb 2024     Answering \"YES\" requires the addition of the dotphrase \"IMMUNOSUPPRESSED\" as the first diagnosis of the patient's visit.   FAMILY HISTORY:  Any \"first degree relatives\" (parent, brother, sister, or child) with the following?    Skin Cancer, Pancreatic or Other Cancer? YES, pancreatic cancer    PATIENT EXPERIENCE:    Do you want the Dermatologist to perform a COMPLETE skin exam today including a clinical examination under the \"bra and underwear\" areas?  Yes  If necessary, do we have your permission to call and leave a detailed message on your Preferred Phone number that includes your specific medical information?  Yes      Allergies[1] Current Medications[2]          Whom besides the patient is providing clinical information about today's encounter?   NO ADDITIONAL HISTORIAN (patient alone provided history)    Physical Exam and Assessment/Plan by Diagnosis:    SOC " on nose where BCC was  Rash on back and buttock. Patient was sitting and laying a lot after kidney stones      HISTORY OF BASAL CELL CARCINOMA    Physical Exam:  Anatomic Location Affected:  right supra nasal tip  Morphological Description of scar:  well healed  Suspected Recurrence: No  Pertinent Positives:  Pertinent Negatives:      Additional History of Present Condition:  History of basal cell carcinoma with no sign of recurrence. Patient had mohs on 10/27/2020 with Dr Brown.     Assessment and Plan:  Based on a thorough discussion of this condition and the management approach to it (including a comprehensive discussion of the known risks, side effects and potential benefits of treatment), the patient (family) agrees to implement the following specific plan:  When outside we recommend using a wide brim hat, sunglasses, long sleeve and pants, sunscreen with SPF 30+ with reapplication every 2 hours, or SPF specific clothing      How can basal cell carcinoma be prevented?  The most important way to prevent BCC is to avoid sunburn. This is especially important in childhood and early life. Fair skinned individuals and those with a personal or family history of BCC should protect their skin from sun exposure daily, year-round and lifelong.  Stay indoors or under the shade in the middle of the day   Wear covering clothing   Apply high protection factor SPF50+ broad-spectrum sunscreens generously to exposed skin if outdoors   Avoid indoor tanning (sun beds, solaria)  Oral nicotinamide (vitamin B3) in a dose of 500 mg twice daily may reduce the number and severity of BCCs.    What is the outlook for basal cell carcinoma?  Most BCCs are cured by treatment. Cure is most likely if treatment is undertaken when the lesion is small.  About 50% of people with BCC develop a second one within 3 years of the first. They are also at increased risk of other skin cancers, especially melanoma. Regular self-skin examinations and  long-term annual skin checks by an experienced health professional are recommended.       HISTORY OF SQUAMOUS CELL CARCINOMA IN SITU    Physical Exam:  Anatomic Location Affected:  skin of neck  Morphological Description of Scar:  well healed  Suspected Recurrence: no  Regional adenopathy: no    Additional History of Present Condition:  patient had radiation and chemo treatment in 2024 in [place]    Assessment and Plan:  Based on a thorough discussion of this condition and the management approach to it (including a comprehensive discussion of the known risks, side effects and potential benefits of treatment), the patient (family) agrees to implement the following specific plan:  When outside we recommend using a wide brim hat, sunglasses, long sleeve and pants, sunscreen with SPF 30+ with reapplication every 2 hours, or SPF specific clothing      How can SCC be prevented?  The most important way to prevent SCC is to avoid sunburn. This is especially important in childhood and early life. Fair skinned individuals and those with a personal or family history of BCC should protect their skin from sun exposure daily, year-round and lifelong.  Stay indoors or under the shade in the middle of the day   Wear covering clothing   Apply high protection factor SPF50+ broad-spectrum sunscreens generously to exposed skin if outdoors   Avoid indoor tanning (sun beds, solaria)      What is the outlook for SCC?  Most SCC are cured by treatment. Cure is most likely if treatment is undertaken when the lesion is small. A small percent of SCC's can spread to lymph  nodes and long term monitoring is indicated.   They are also at increased risk of other skin cancers, especially melanoma. Regular self-skin examinations and long-term annual skin checks by an experienced health professional are recommended.       ACTINIC KERATOSIS    Physical Exam:  Anatomic Location Affected:  forehead  Morphological Description:  Scaly pink  papules  Pertinent Positives:  Pertinent Negatives:      Assessment and Plan:  Based on a thorough discussion of this condition and the management approach to it (including a comprehensive discussion of the known risks, side effects and potential benefits of treatment), the patient (family) agrees to implement the following specific plan:  When outside we recommend using a wide brim hat, sunglasses, long sleeve and pants, sunscreen with SPF 30+ with reapplication every 2 hours, or SPF specific clothing   liquid nitrogen to treat areas. Consent obtained. Expect area to blister, crust, and then fall off within 2 weeks. Please use vaseline.                                     PROCEDURE:  DESTRUCTION OF PRE-MALIGNANT LESIONS  After a thorough discussion of treatment options and risk/benefits/alternatives (including but not limited to local pain, scarring, dyspigmentation, blistering, and possible superinfection), verbal and written consent were obtained and the aforementioned lesions were treated on with cryotherapy using liquid nitrogen x 1 cycle for 5-10 seconds.    TOTAL NUMBER of 5 pre-malignant lesions were treated today on the ANATOMIC LOCATION: forehead.     The patient tolerated the procedure well, and after-care instructions were provided.        HEAT RASH  Physical Exam:  Anatomic Location Affected:  back, buttock with pink erythema likely from irritation  Morphological Description:  pink papules  Pertinent Positives:  Pertinent Negatives:    Additional History of Present Condition:  patients wife mentioned patient has been sitting and laying a lot since having kidney stones     Assessment and Plan:  Based on a thorough discussion of this condition and the management approach to it (including a comprehensive discussion of the known risks, side effects and potential benefits of treatment), the patient (family) agrees to implement the following specific plan:  Monitor  Try to keep cool    Use Zinc oxide paste of  "Vaseline for the buttock area         NEOPLASM OF UNCERTAIN BEHAVIOR OF SKIN    Physical Exam:  (Anatomic Location); (Size and Morphological Description); (Differential Diagnosis):  A: tip of nose; skin; shave biopsy; 84 year old male with a 0.5 cm pink macule; DDX: AK  B: left tip of nose; skin; shave biopsy; 84 year old male with a 0.5 cm pink macule; DDX: AK  C: left upper back;skin; shave biopsy; 84 year old male with a 0.8 cm pink papule; DDX: BCC  Pertinent Positives:  Pertinent Negatives:    Additional History of Present Condition:  present on exam                      Assessment and Plan:  I have discussed with the patient that a sample of skin via a \"skin biopsy” would be potentially helpful to further make a specific diagnosis under the microscope.  Based on a thorough discussion of this condition and the management approach to it (including a comprehensive discussion of the known risks, side effects and potential benefits of treatment), the patient (family) agrees to implement the following specific plan:    Procedure:  Skin Biopsy.  After a thorough discussion of treatment options and risk/benefits/alternatives (including but not limited to local pain, scarring, dyspigmentation, blistering, possible superinfection, and inability to confirm a diagnosis via histopathology), verbal and written consent were obtained and portion of the rash was biopsied for tissue sample.  See below for consent that was obtained from patient and subsequent Procedure Note.     PROCEDURE TANGENTIAL (SHAVE) BIOPSY NOTE:    Performing Physician:    Anatomic Location; Clinical Description with size (cm); Pre-Op Diagnosis:   A: tip of nose; skin; shave biopsy; 84 year old male with a 0.5 cm pink macule; DDX: AK  B: left tip of nose; skin; shave biopsy; 84 year old male with a 0.5 cm pink macule; DDX: AK  C: left upper back;skin; shave biopsy; 84 year old male with a 0.8 cm pink papule; DDX: BCC  Post-op diagnosis: Same " "    Local anesthesia: 1% xylocaine with epi      Topical anesthesia: None    Hemostasis: Aluminum chloride       After obtaining informed consent  at which time there was a discussion about the purpose of biopsy  and low risks of infection and bleeding.  The area was prepped and draped in the usual fashion. Anesthesia was obtained with 1% lidocaine with epinephrine. A shave biopsy to an appropriate sampling depth was obtained by Shave (Dermablade or 15 blade) The resulting wound was covered with surgical ointment and bandaged appropriately.     The patient tolerated the procedure well without complications and was without signs of functional compromise.      Specimen has been sent for review by Dermatopathology.    Standard post-procedure care has been explained and has been included in written form within the patient's copy of Informed Consent.    INFORMED CONSENT DISCUSSION AND POST-OPERATIVE INSTRUCTIONS FOR PATIENT    I.  RATIONALE FOR PROCEDURE  I understand that a skin biopsy allows the Dermatologist to test a lesion or rash under the microscope to obtain a diagnosis.  It usually involves numbing the area with numbing medication and removing a small piece of skin; sometimes the area will be closed with sutures. In this specific procedure, sutures are not usually needed.  If any sutures are placed, then they are usually need to be removed in 2 weeks or less.    I understand that my Dermatologist recommends that a skin \"shave\" biopsy be performed today.  A local anesthetic, similar to the kind that a dentist uses when filling a cavity, will be injected with a very small needle into the skin area to be sampled.  The injected skin and tissue underneath \"will go to sleep” and become numb so no pain should be felt afterwards.  An instrument shaped like a tiny \"razor blade\" (shave biopsy instrument) will be used to cut a small piece of tissue and skin from the area so that a sample of tissue can be taken and examined " "more closely under the microscope.  A slight amount of bleeding will occur, but it will be stopped with direct pressure and a pressure bandage and any other appropriate methods.  I understands that a scar will form where the wound was created.  Surgical ointment will be applied to help protect the wound.  Sutures are not usually needed.    II.  RISKS AND POTENTIAL COMPLICATIONS   I understand the risks and potential complications of a skin biopsy include but are not limited to the following:  Bleeding  Infection  Pain  Scar/keloid  Skin discoloration  Incomplete Removal  Recurrence  Nerve Damage/Numbness/Loss of Function  Allergic Reaction to Anesthesia  Biopsies are diagnostic procedures and based on findings additional treatment or evaluation may be required  Loss or destruction of specimen resulting in no additional findings    My Dermatologist has explained to me the nature of the condition, the nature of the procedure, and the benefits to be reasonably expected compared with alternative approaches.  My Dermatologist has discussed the likelihood of major risks or complications of this procedure including the specific risks listed above, such as bleeding, infection, and scarring/keloid.  I understand that a scar is expected after this procedure.  I understand that my physician cannot predict if the scar will form a \"keloid,\" which extends beyond the borders of the wound that is created.  A keloid is a thick, painful, and bumpy scar.  A keloid can be difficult to treat, as it does not always respond well to therapy, which includes injecting cortisone directly into the keloid every few weeks.  While this usually reduces the pain and size of the scar, it does not eliminate it.      I understand that photographs may be taken before and after the procedure.  These will be maintained as part of the medical providers confidential records and may not be made available to me.  I further authorize the medical provider to " "use the photographs for teaching purposes or to illustrate scientific papers, books, or lectures if in his/her judgment, medical research, education, or science may benefit from its use.    I have had an opportunity to fully inquire about the risks and benefits of this procedure and its alternatives.   I have been given ample time and opportunity to ask questions and to seek a second opinion if I wished to do so.  I acknowledge that there have specifically been no guarantees as to the cosmetic results from the procedure.  I am aware that with any procedure there is always the possibility of an unexpected complication.    III. POST-PROCEDURAL CARE (WHAT YOU WILL NEED TO DO \"AFTER THE BIOPSY\" TO OPTIMIZE HEALING)    Keep the area clean and dry.  Try NOT to remove the bandage or get it wet for the first 24 hours.    Gently clean the area and apply surgical ointment (such as Vaseline petrolatum ointment, which is available \"over the counter\" and not a prescription) to the biopsy site for up to 2 weeks straight.  This acts to protect the wound from the outside world.      Sutures are not usually placed in this procedure.  If any sutures were placed, return for suture removal as instructed (generally 1 week for the face, 2 weeks for the body).      Take Acetaminophen (Tylenol) for discomfort, if no contraindications.  Ibuprofen or aspirin could make bleeding worse.    Call our office immediately for signs of infection: fever, chills, increased redness, warmth, tenderness, discomfort/pain, or pus or foul smell coming from the wound.    WHAT TO DO IF THERE IS ANY BLEEDING?  If a small amount of bleeding is noticed, place a clean cloth over the area and apply firm pressure for ten minutes.  Check the wound after 10 minutes of direct pressure.  If bleeding persists, try one more time for an additional 10 minutes of direct pressure on the area.  If the bleeding becomes heavier or does not stop after the second attempt, or if " "you have any other questions about this procedure, then please call your [facility] Dermatologist by calling [phone] (SKIN).     I hereby acknowledge that I have reviewed and verified the site with my Dermatologist and have requested and authorized my Dermatologist to proceed with the procedure.           MELANOCYTIC NEVI (\"Moles\")    Physical Exam:  Anatomic Location Affected:   Mostly on sun-exposed areas of the trunk and extremities  Morphological Description:  Scattered, 1-4mm round to ovoid, symmetrical-appearing, even bordered, skin colored to dark brown macules/papules, mostly in sun-exposed areas  Pertinent Positives:  Pertinent Negatives:    Additional History of Present Condition:      Assessment and Plan:  Based on a thorough discussion of this condition and the management approach to it (including a comprehensive discussion of the known risks, side effects and potential benefits of treatment), the patient (family) agrees to implement the following specific plan:  When outside we recommend using a wide brim hat, sunglasses, long sleeve and pants, sunscreen with SPF 30+ with reapplication every 2 hours, or SPF specific clothing   Benign, reassured  Regular skin check     Melanocytic Nevi  Melanocytic nevi (\"moles\") are tan or brown, raised or flat areas of the skin which have an increased number of melanocytes. Melanocytes are the cells in our body which make pigment and account for skin color.    Some moles are present at birth (I.e., \"congenital nevi\"), while others come up later in life (i.e., \"acquired nevi\").  The sun can stimulate the body to make more moles.  Sunburns are not the only thing that triggers more moles.  Chronic sun exposure can do it too.     Clinically distinguishing a healthy mole from melanoma may be difficult, even for experienced dermatologists. The \"ABCDE's\" of moles have been suggested as a means of helping to alert a person to a suspicious mole and the possible increased " "risk of melanoma.  The suggestions for raising alert are as follows:    Asymmetry: Healthy moles tend to be symmetric, while melanomas are often asymmetric.  Asymmetry means if you draw a line through the mole, the two halves do not match in color, size, shape, or surface texture. Asymmetry can be a result of rapid enlargement of a mole, the development of a raised area on a previously flat lesion, scaling, ulceration, bleeding or scabbing within the mole.  Any mole that starts to demonstrate \"asymmetry\" should be examined promptly by a board certified dermatologist.     Border: Healthy moles tend to have discrete, even borders.  The border of a melanoma often blends into the normal skin and does not sharply delineate the mole from normal skin.  Any mole that starts to demonstrate \"uneven borders\" should be examined promptly by a board certified dermatologist.     Color: Healthy moles tend to be one color throughout.  Melanomas tend to be made up of different colors ranging from dark black, blue, white, or red.  Any mole that demonstrates a color change should be examined promptly by a board certified dermatologist.     Diameter: Healthy moles tend to be smaller than 0.6 cm in size; an exception are \"congenital nevi\" that can be larger.  Melanomas tend to grow and can often be greater than 0.6 cm (1/4 of an inch, or the size of a pencil eraser). This is only a guideline, and many normal moles may be larger than 0.6 cm without being unhealthy.  Any mole that starts to change in size (small to bigger or bigger to smaller) should be examined promptly by a board certified dermatologist.     Evolving: Healthy moles tend to \"stay the same.\"  Melanomas may often show signs of change or evolution such as a change in size, shape, color, or elevation.  Any mole that starts to itch, bleed, crust, burn, hurt, or ulcerate or demonstrate a change or evolution should be examined promptly by a board certified dermatologist.  "       LENTIGO    Physical Exam:  Anatomic Location Affected:  trunk, arms  Morphological Description:  Light brown macules  Pertinent Positives:  Pertinent Negatives:    Additional History of Present Condition:      Assessment and Plan:  Based on a thorough discussion of this condition and the management approach to it (including a comprehensive discussion of the known risks, side effects and potential benefits of treatment), the patient (family) agrees to implement the following specific plan:  When outside we recommend using a wide brim hat, sunglasses, long sleeve and pants, sunscreen with SPF 30+ with reapplication every 2 hours, or SPF specific clothing       What is a lentigo?  A lentigo is a pigmented flat or slightly raised lesion with a clearly defined edge. Unlike an ephelis (freckle), it does not fade in the winter months. There are several kinds of lentigo.  The name lentigo originally referred to its appearance resembling a small lentil. The plural of lentigo is lentigines, although “lentigos” is also in common use.    Who gets lentigines?  Lentigines can affect males and females of all ages and races. Solar lentigines are especially prevalent in fair skinned adults. Lentigines associated with syndromes are present at birth or arise during childhood.    What causes lentigines?  Common forms of lentigo are due to exposure to ultraviolet radiation:  Sun damage including sunburn   Indoor tanning   Phototherapy, especially photochemotherapy (PUVA)    Ionizing radiation, eg radiation therapy, can also cause lentigines.  Several familial syndromes associated with widespread lentigines originate from mutations in Erick-MAP kinase, mTOR signaling and PTEN pathways.    What is the treatment for lentigines?  Most lentigines are left alone. Attempts to lighten them may not be successful. The following approaches are used:  SPF 50+ broad-spectrum sunscreen   Hydroquinone bleaching cream   Alpha hydroxy acids  "  Vitamin C   Retinoids   Azelaic acid   Chemical peels  Individual lesions can be permanently removed using:  Cryotherapy   Intense pulsed light   Pigment lasers    How can lentigines be prevented?  Lentigines associated with exposure ultraviolet radiation can be prevented by very careful sun protection. Clothing is more successful at preventing new lentigines than are sunscreens.    What is the outlook for lentigines?  Lentigines usually persist. They may increase in number with age and sun exposure. Some in sun-protected sites may fade and disappear.    SANTANA ANGIOMAS    Physical Exam:  Anatomic Location Affected:  trunk  Morphological Description:  Scattered cherry red, 1-4 mm papules.  Pertinent Positives:  Pertinent Negatives:    Additional History of Present Condition:      Assessment and Plan:  Based on a thorough discussion of this condition and the management approach to it (including a comprehensive discussion of the known risks, side effects and potential benefits of treatment), the patient (family) agrees to implement the following specific plan:  Monitor for changes  Benign, reassured      Assessment and Plan:    Cherry angioma, also known as Vazquez de Braden spots, are benign vascular skin lesions. A \"cherry angioma\" is a firm red, blue or purple papule, 0.1-1 cm in diameter. When thrombosed, they can appear black in colour until evaluated with a dermatoscope when the red or purple colour is more easily seen. Cherry angioma may develop on any part of the body but most often appear on the scalp, face, lips and trunk.  An angioma is due to proliferating endothelial cells; these are the cells that line the inside of a blood vessel.    Angiomas can arise in early life or later in life; the most common type of angioma is a cherry angioma.  Cherry angiomas are very common in males and females of any age or race. They are more noticeable in white skin than in skin of colour. They markedly increase in " "number from about the age of 40. There may be a family history of similar lesions. Eruptive cherry angiomas have been rarely reported to be associated with internal malignancy. The cause of angiomas is unknown. Genetic analysis of cherry angiomas has shown that they frequently carry specific somatic missense mutations in the GNAQ and GNA11 (Q209H) genes, which are involved in other vascular and melanocytic proliferations.      SEBORRHEIC KERATOSIS; NON-INFLAMED    Physical Exam:  Anatomic Location Affected:  trunk  Morphological Description:  Flat and raised, waxy, smooth to warty textured, yellow to brownish-grey to dark brown to blackish, discrete, \"stuck-on\" appearing papules.  Pertinent Positives:  Pertinent Negatives:    Additional History of Present Condition:      Assessment and Plan:  Based on a thorough discussion of this condition and the management approach to it (including a comprehensive discussion of the known risks, side effects and potential benefits of treatment), the patient (family) agrees to implement the following specific plan:  Monitor for changes  Benign, reassured      Seborrheic Keratosis  A seborrheic keratosis is a harmless warty spot that appears during adult life as a common sign of skin aging.  Seborrheic keratoses can arise on any area of skin, covered or uncovered, with the usual exception of the palms and soles. They do not arise from mucous membranes. Seborrheic keratoses can have highly variable appearance.      Seborrheic keratoses are extremely common. It has been estimated that over 90% of adults over the age of 60 years have one or more of them. They occur in males and females of all races, typically beginning to erupt in the 30s or 40s. They are uncommon under the age of 20 years.  The precise cause of seborrhoeic keratoses is not known.  Seborrhoeic keratoses are considered degenerative in nature. As time goes by, seborrheic keratoses tend to become more numerous. Some " "people inherit a tendency to develop a very large number of them; some people may have hundreds of them.      There is no easy way to remove multiple lesions on a single occasion.  Unless a specific lesion is \"inflamed\" and is causing pain or stinging/burning or is bleeding, most insurance companies do not authorize treatment.    XEROSIS (\"DRY SKIN\")    Physical Exam:  Anatomic Location Affected:  diffuse  Morphological Description:  xerosis  Pertinent Positives:  Pertinent Negatives:    Additional History of Present Condition:      Assessment and Plan:  Based on a thorough discussion of this condition and the management approach to it (including a comprehensive discussion of the known risks, side effects and potential benefits of treatment), the patient (family) agrees to implement the following specific plan:  Use moisturizer like Eucerin,Cerave or Aveeno Cream 3 times a day for the dry skin            Dry skin refers to skin that feels dry to touch. Dry skin has a dull surface with a rough, scaly quality. The skin is less pliable and cracked. When dryness is severe, the skin may become inflamed and fissured.  Although any body site can be dry, dry skin tends to affect the shins more than any other site.    Dry skin is lacking moisture in the outer horny cell layer (stratum corneum) and this results in cracks in the skin surface.  Dry skin is also called xerosis, xeroderma or asteatosis (lack of fat).  It can affect males and females of all ages. There is some racial variability in water and lipid content of the skin.  Dry skin that starts in early childhood may be one of about 20 types of ichthyosis (fish-scale skin). There is often a family history of dry skin.   Dry skin is commonly seen in people with atopic dermatitis.  Nearly everyone > 60 years has dry skin.    Dry skin that begins later may be seen in people with certain diseases and conditions.  Postmenopausal women  Hypothyroidism  Chronic renal disease "   Malnutrition and weight loss   Subclinical dermatitis   Treatment with certain drugs such as oral retinoids, diuretics and epidermal growth factor receptor inhibitors      What is the treatment for dry skin?  The mainstay of treatment of dry skin and ichthyosis is moisturisers/emollients. They should be applied liberally and often enough to:  Reduce itch   Improve the barrier function   Prevent entry of irritants, bacteria   Reduce transepidermal water loss.      How can dry skin be prevented?  Eliminate aggravating factors:  Reduce the frequency of bathing.   A humidifier in winter and air conditioner in summer   Compare having a short shower with a prolonged soak in a bath.   Use lukewarm, not hot, water.   Replace standard soap with a substitute such as a synthetic detergent cleanser, water-miscible emollient, bath oil, anti-pruritic tar oil, colloidal oatmeal etc.   Apply an emollient liberally and often, particularly shortly after bathing, and when itchy. The drier the skin, the thicker this should be, especially on the hands.    What is the outlook for dry skin?  A tendency to dry skin may persist life-long, or it may improve once contributing factors are controlled.       Scribe Attestation      I,:  Sherley Velarde MA am acting as a scribe while in the presence of the attending physician.:       I,:  Tiffany Sexton MD personally performed the services described in this documentation    as scribed in my presence.:                 [1]   Allergies  Allergen Reactions    Apixaban Other (See Comments)   [2]   Current Outpatient Medications:     dorzolamide (TRUSOPT) 2 % ophthalmic solution, INSTILL 1 DROP INTO AFFECTED EYE(S) BY OPHTHALMIC ROUTE 3 TIMES PER DAY, Disp: , Rfl:     Latanoprost PF 0.005 % SOLN, Apply to eye daily, Disp: , Rfl:     metoprolol succinate (TOPROL-XL) 25 mg 24 hr tablet, Take 1 tablet by mouth daily, Disp: , Rfl:     rivaroxaban (Xarelto) 15 mg tablet, Take 15 mg by mouth, Disp: , Rfl:      benzoyl peroxide 10 % gel, Apply topically daily (Patient not taking: Reported on 7/3/2025), Disp: , Rfl:     cholecalciferol (VITAMIN D3) 1,000 units tablet, Take 1,000 Units by mouth in the morning. (Patient not taking: Reported on 7/3/2025), Disp: , Rfl:     Misc Natural Products (LUTEIN 20 PO), Take by mouth (Patient not taking: Reported on 7/3/2025), Disp: , Rfl:     sildenafil (VIAGRA) 50 MG tablet, Take 1 tablet (50 mg total) by mouth daily as needed for erectile dysfunction (Patient not taking: Reported on 7/3/2025), Disp: 10 tablet, Rfl: 3    vitamin B-12 (CYANOCOBALAMIN) 50 MCG tablet, Take by mouth daily (Patient not taking: Reported on 7/3/2025), Disp: , Rfl:

## 2025-07-07 LAB
CALCIUM OXALATE DIHYDRATE MFR STONE IR: 10 %
COLOR STONE: NORMAL
COM MFR STONE: 90 %
SIZE STONE: NORMAL MM
SPEC SOURCE SUBJ: NORMAL
STONE ANALYSIS-IMP: NORMAL
WT STONE: 53 MG

## 2025-07-10 ENCOUNTER — TELEPHONE (OUTPATIENT)
Age: 85
End: 2025-07-10

## 2025-07-10 NOTE — TELEPHONE ENCOUNTER
Wife called office to review if pathology report was finalized. I informed her that it's still in process and most likely it meay be ready by next week.     She is more than welcome to call us back Monday to review if we have more information.

## 2025-07-11 PROCEDURE — 88341 IMHCHEM/IMCYTCHM EA ADD ANTB: CPT | Performed by: STUDENT IN AN ORGANIZED HEALTH CARE EDUCATION/TRAINING PROGRAM

## 2025-07-11 PROCEDURE — 88305 TISSUE EXAM BY PATHOLOGIST: CPT | Performed by: STUDENT IN AN ORGANIZED HEALTH CARE EDUCATION/TRAINING PROGRAM

## 2025-07-11 PROCEDURE — 88342 IMHCHEM/IMCYTCHM 1ST ANTB: CPT | Performed by: STUDENT IN AN ORGANIZED HEALTH CARE EDUCATION/TRAINING PROGRAM

## 2025-07-14 ENCOUNTER — CONSULT (OUTPATIENT)
Dept: HEMATOLOGY ONCOLOGY | Facility: CLINIC | Age: 85
End: 2025-07-14
Payer: COMMERCIAL

## 2025-07-14 ENCOUNTER — TELEPHONE (OUTPATIENT)
Dept: DERMATOLOGY | Facility: CLINIC | Age: 85
End: 2025-07-14

## 2025-07-14 VITALS
TEMPERATURE: 97.8 F | BODY MASS INDEX: 19.44 KG/M2 | HEART RATE: 64 BPM | WEIGHT: 121 LBS | SYSTOLIC BLOOD PRESSURE: 126 MMHG | OXYGEN SATURATION: 96 % | DIASTOLIC BLOOD PRESSURE: 74 MMHG | RESPIRATION RATE: 18 BRPM | HEIGHT: 66 IN

## 2025-07-14 DIAGNOSIS — C76.0 HEAD AND NECK CANCER (HCC): Primary | ICD-10-CM

## 2025-07-14 DIAGNOSIS — E03.2 DRUG-INDUCED HYPOTHYROIDISM: ICD-10-CM

## 2025-07-14 DIAGNOSIS — C77.0 MALIGNANT NEOPLASM METASTATIC TO LYMPH NODE OF HEAD (HCC): ICD-10-CM

## 2025-07-14 PROCEDURE — 99204 OFFICE O/P NEW MOD 45 MIN: CPT | Performed by: INTERNAL MEDICINE

## 2025-07-14 NOTE — TELEPHONE ENCOUNTER
Received return call from Patient's Wife on behalf of Patient to schedule excision procedure. I do not have access to sooner Procedure slots for [place]. Tried to schedule Procedure at [place], but they do not want to travel that far.     Beat[facility]: Please call back Patient's Wife to schedule.: [phone]

## 2025-07-14 NOTE — PROGRESS NOTES
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 They have transferred records from there to [facility]'s system but the documents are not clearly legible for the most part. 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 Patient underwent 7 cycles of cisplatin and radiation for definitive treatment which he tolerated fairly well as did not require feeding tube and continued eating throughout the treatment. No staging information available.  Since then as per them, he has been on surveillance every 3 months with alternating PET and CT scans and there hasn't been a concern for residual, recurrence, or metastatic lesion so far.     Clinically well without concerns for recurrence/residual/metastatic disease    Plan  Establish care with ENT for periodic surveillance  Obtain CT neck and chest w contrast as due for survelilance  Repeat CBC, CMP, Mg, LD  Referral to H&N nutrition services  Follow up in 10 - 12 weeks for routine surveillance follow up   Drug-induced hypothyroidism  No recent TSH on file  No history of hypothyroidism    Plan  Check TSH to monitor radiation side effects    Return in about 11 weeks (around 2025) for Office Visit, Imaging - See orders.    History of Present Illness   Chief Complaint   Patient presents with    Consult     Braeden hoover a 84/M who is here to transfer care from  [facility] as they are moving permanently to this area. They have transferred records from there to [facility]'s system but the documents are not clearly legible for the most part. 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 Patient underwent 7 cycles of cisplatin and radiation for definitive treatment which he tolerated fairly well as did not require feeding tube and continued eating throughout the treatment. Since then as per them, he has been on surveillance every 3 months with alternating PET and CT scans and there hasn't been a concern for residual, recurrence, or metastatic lesion so far.     Today mentions feeling well except not able to gain weight since losing weight during chemoradiation. Mentions ongoing altered taste since chemoradiation. Low appetite and food intake. Other no pain, rash, lumps, shortness of breath, chest pain.     Pertinent Medical History   Medical History Reviewed by provider this encounter.    Review of Systems   Constitutional:  Positive for appetite change and fatigue. Negative for chills, diaphoresis, fever and unexpected weight change.   HENT:  Negative for ear pain, sore throat, trouble swallowing and voice change.         Altered taste sensation   Eyes:  Negative for pain and visual disturbance.   Respiratory:  Negative for cough and shortness of breath.    Cardiovascular:  Negative for chest pain and palpitations.   Gastrointestinal:  Negative for abdominal pain and vomiting.   Genitourinary:  Negative for dysuria and hematuria.   Musculoskeletal:  Negative for arthralgias and back pain.   Skin:  Negative for color change and rash.   Neurological:  Negative for seizures and syncope.   Psychiatric/Behavioral:  Negative  "for agitation and behavioral problems.    All other systems reviewed and are negative.      Objective   /74 (BP Location: Left arm, Patient Position: Sitting, Cuff Size: Adult)   Pulse 64   Temp 97.8 °F (36.6 °C) (Temporal)   Resp 18   Ht 5' 6\" (1.676 m)   Wt 54.9 kg (121 lb)   SpO2 96%   BMI 19.53 kg/m²     Pain Screening:  Pain Score: 0-No pain  ECOG 2    Physical Exam  Vitals reviewed.   Constitutional:       General: He is not in acute distress.     Appearance: Normal appearance. He is not ill-appearing.   HENT:      Mouth/Throat:      Mouth: Mucous membranes are moist.      Pharynx: No oropharyngeal exudate or posterior oropharyngeal erythema.     Eyes:      Conjunctiva/sclera: Conjunctivae normal.       Cardiovascular:      Rate and Rhythm: Normal rate and regular rhythm.      Heart sounds: Normal heart sounds.   Pulmonary:      Effort: Pulmonary effort is normal.      Breath sounds: Normal breath sounds.   Abdominal:      General: Abdomen is flat. There is no distension.      Palpations: There is no mass.      Tenderness: There is no abdominal tenderness.     Musculoskeletal:         General: Normal range of motion.     Skin:     General: Skin is warm.      Coloration: Skin is not jaundiced or pale.     Neurological:      Mental Status: He is alert and oriented to person, place, and time.     Psychiatric:         Mood and Affect: Mood normal.         Behavior: Behavior normal.       Labs: I have reviewed the following labs:  Lab Results   Component Value Date/Time    WBC 11.20 (H) 06/27/2025 04:56 AM    RBC 4.08 06/27/2025 04:56 AM    Hemoglobin 12.4 06/27/2025 04:56 AM    Hematocrit 37.9 06/27/2025 04:56 AM    MCV 93 06/27/2025 04:56 AM    MCH 30.4 06/27/2025 04:56 AM    RDW 14.0 06/27/2025 04:56 AM    Platelets 194 06/27/2025 04:56 AM     Lab Results   Component Value Date/Time    Potassium 4.6 06/26/2025 05:21 PM    Chloride 105 06/26/2025 05:21 PM    CO2 22 06/26/2025 05:21 PM    BUN 27 (H) " 06/26/2025 05:21 PM    Creatinine 0.87 06/26/2025 05:21 PM    Calcium 10.1 06/26/2025 05:21 PM    eGFR 78 06/26/2025 05:21 PM     Radiology Results Review : No pertinent imaging studies reviewed.    Mehul Landers MD  PGY-4, Hematology and Medical Oncology  [facility]

## 2025-07-14 NOTE — TELEPHONE ENCOUNTER
Per inn basket message-    Tiffany Sexton MD to [facility] [place] [facility] (Selected Message)      7/12/25  4:13 PM  Note     Please schedule for excision for C with me or resident excision clinic          Called pt to schedule for above message but n/a, left v/m with c/b info.

## 2025-07-14 NOTE — ASSESSMENT & PLAN NOTE
Orders:    CT neck and chest w contrast; Future    CBC and differential; Future    Comprehensive metabolic panel; Future    TSH, 3rd generation with Free T4 reflex; Future    Magnesium; Future    LD,Blood; Future    Ambulatory Referral to Nutrition Services for Oncology; Future    Ambulatory Referral to Otolaryngology; Future

## 2025-07-14 NOTE — TELEPHONE ENCOUNTER
Called pt and spoke to pts wife who declined the CV office and didn't want to wait until Dr. Sexton next procedure appt in [facility] and asked for the [facility] office. Pt wife said she has an appt this same day with Dr. Faith at 2pm.

## 2025-07-14 NOTE — ASSESSMENT & PLAN NOTE
84/M who is here to transfer care from [facility] as they are moving permanently to this area. They have transferred records from there to [facility]'s system but the documents are not clearly legible for the most part. 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 Patient underwent 7 cycles of cisplatin and radiation for definitive treatment which he tolerated fairly well as did not require feeding tube and continued eating throughout the treatment. No staging information available.  Since then as per them, he has been on surveillance every 3 months with alternating PET and CT scans and there hasn't been a concern for residual, recurrence, or metastatic lesion so far.     Clinically well without concerns for recurrence/residual/metastatic disease    Plan  Establish care with ENT for periodic surveillance  Obtain CT neck and chest w contrast as due for survelilance  Repeat CBC, CMP, Mg, LD  Referral to H&N nutrition services  Follow up in 10 - 12 weeks for routine surveillance follow up

## 2025-07-14 NOTE — PROGRESS NOTES
"Name: Braeden Simpson      : 1940      MRN: [id number]  Encounter Provider: Cristin Emmanuel MD  Encounter Date: 2025   Encounter department: 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    History of Present Illness {?Quick Links Encounters * My Last Note * Last Note in Specialty * Snapshot * Since Last Visit * History :54688}  Chief Complaint   Patient presents with    Consult     Braeden BARRY Tatiana    Pertinent Medical History   Medical History Reviewed by provider this encounter    Review of Systems    Objective {?Quick Links Trend Vitals * Enter New Vitals * Results Review * Timeline (Adult) * Labs * Imaging * Cardiology * Procedures * Lung Cancer Screening * Surgical eConsent :45335}  /74 (BP Location: Left arm, Patient Position: Sitting, Cuff Size: Adult)   Pulse 64   Temp 97.8 °F (36.6 °C) (Temporal)   Resp 18   Ht 5' 6\" (1.676 m)   Wt 54.9 kg (121 lb)   SpO2 96%   BMI 19.53 kg/m²     Pain Screening:  Pain Score: 0-No pain  ECOG   ***  Physical Exam    Labs: I have reviewed the following labs:  Lab Results   Component Value Date/Time    WBC 11.20 (H) 2025 04:56 AM    RBC 4.08 2025 04:56 AM    Hemoglobin 12.4 2025 04:56 AM    Hematocrit 37.9 2025 04:56 AM    MCV 93 2025 04:56 AM    MCH 30.4 2025 04:56 AM    RDW " 14.0 06/27/2025 04:56 AM    Platelets 194 06/27/2025 04:56 AM     Lab Results   Component Value Date/Time    Potassium 4.6 06/26/2025 05:21 PM    Chloride 105 06/26/2025 05:21 PM    CO2 22 06/26/2025 05:21 PM    BUN 27 (H) 06/26/2025 05:21 PM    Creatinine 0.87 06/26/2025 05:21 PM    Calcium 10.1 06/26/2025 05:21 PM    eGFR 78 06/26/2025 05:21 PM   {SL AMB ONCOLOGY LABS (Optional):35595}    {Radiology Results Review (Optional):18735}    {Administrative / Billing Section (Optional):93133}

## 2025-07-15 ENCOUNTER — DOCUMENTATION (OUTPATIENT)
Dept: NUTRITION | Facility: CLINIC | Age: 85
End: 2025-07-15

## 2025-07-15 ENCOUNTER — PATIENT OUTREACH (OUTPATIENT)
Dept: HEMATOLOGY ONCOLOGY | Facility: CLINIC | Age: 85
End: 2025-07-15

## 2025-07-15 DIAGNOSIS — C76.0 HEAD AND NECK CANCER (HCC): Primary | ICD-10-CM

## 2025-07-17 ENCOUNTER — TELEPHONE (OUTPATIENT)
Age: 85
End: 2025-07-17

## 2025-07-17 NOTE — TELEPHONE ENCOUNTER
Pt needs an ENT appt and wants to see Dr Marley.  I gave spouse the phone number to [facility] and transferred her to the office, [phone]

## 2025-07-18 ENCOUNTER — APPOINTMENT (OUTPATIENT)
Dept: LAB | Facility: MEDICAL CENTER | Age: 85
End: 2025-07-18
Payer: COMMERCIAL

## 2025-07-18 DIAGNOSIS — E78.5 DYSLIPIDEMIA: ICD-10-CM

## 2025-07-18 LAB
CHOLEST SERPL-MCNC: 138 MG/DL (ref ?–200)
HDLC SERPL-MCNC: 52 MG/DL
LDLC SERPL CALC-MCNC: 74 MG/DL (ref 0–100)
TRIGL SERPL-MCNC: 58 MG/DL (ref ?–150)

## 2025-07-18 PROCEDURE — 36415 COLL VENOUS BLD VENIPUNCTURE: CPT

## 2025-07-18 PROCEDURE — 80061 LIPID PANEL: CPT

## 2025-08-05 ENCOUNTER — PROCEDURE VISIT (OUTPATIENT)
Age: 85
End: 2025-08-05
Payer: COMMERCIAL

## 2025-08-05 DIAGNOSIS — Z85.828 HISTORY OF BASAL CELL CARCINOMA: Primary | ICD-10-CM

## 2025-08-05 PROCEDURE — 88305 TISSUE EXAM BY PATHOLOGIST: CPT | Performed by: STUDENT IN AN ORGANIZED HEALTH CARE EDUCATION/TRAINING PROGRAM

## 2025-08-05 PROCEDURE — 12032 INTMD RPR S/A/T/EXT 2.6-7.5: CPT | Performed by: DERMATOLOGY

## 2025-08-05 PROCEDURE — 11604 EXC TR-EXT MAL+MARG 3.1-4 CM: CPT | Performed by: DERMATOLOGY

## 2025-08-11 ENCOUNTER — APPOINTMENT (OUTPATIENT)
Dept: LAB | Facility: MEDICAL CENTER | Age: 85
End: 2025-08-11
Payer: COMMERCIAL

## 2025-08-19 ENCOUNTER — OFFICE VISIT (OUTPATIENT)
Age: 85
End: 2025-08-19

## 2025-08-19 DIAGNOSIS — Z48.02 ENCOUNTER FOR REMOVAL OF SUTURES: Primary | ICD-10-CM

## (undated) DEVICE — SPECIMEN CONTAINER STERILE PEEL PACK

## (undated) DEVICE — FIBER STD QUANTA 272 MICRON

## (undated) DEVICE — CYSTO TUBING TUR Y IRRIGATION

## (undated) DEVICE — SEAL BIOPSY PORT ADJUST F/ACCESSORIES UP TO 3FR

## (undated) DEVICE — NGAGE, NITINOL STONE EXTRACTOR: Brand: NGAGE

## (undated) DEVICE — FABRIC REINFORCED, SURGICAL GOWN, XL: Brand: CONVERTORS

## (undated) DEVICE — TOWEL SET X-RAY

## (undated) DEVICE — POUCH URO CATCHER II STERILE

## (undated) DEVICE — SINGLE-USE DIGITAL FLEXIBLE URETEROSCOPE: Brand: APTRA

## (undated) DEVICE — CATH URET POLYURETHANE 5FR 28IN WHISTLE TIP

## (undated) DEVICE — BETADINE PREP STICKS

## (undated) DEVICE — CYSTOSCOPY PACK: Brand: CONVERTORS

## (undated) DEVICE — LUBRICANT JELLY SURGILUBE TUBE 4OZ FLIP TOP

## (undated) DEVICE — GUIDEWIRE STRGHT TIP 0.038 IN SOLO PLUS

## (undated) DEVICE — SOLUTION BOWL: Brand: KENDALL

## (undated) DEVICE — GLOVE SRG LF STRL BGL SKNSNS 8 PF

## (undated) DEVICE — SPONGE 4 X 4 XRAY 16 PLY STRL LF RFD

## (undated) DEVICE — SHEATH URETERAL ACCESS 12/14FR 45CM PROXIS

## (undated) DEVICE — BASKET SPECIMEN RETRIVAL 1.9FR 120CM

## (undated) DEVICE — BAG URINE DRAINAGE 2000ML ANTI RFLX LF

## (undated) DEVICE — Device